# Patient Record
Sex: FEMALE | Race: WHITE | Employment: FULL TIME | URBAN - METROPOLITAN AREA
[De-identification: names, ages, dates, MRNs, and addresses within clinical notes are randomized per-mention and may not be internally consistent; named-entity substitution may affect disease eponyms.]

---

## 2020-07-24 ENCOUNTER — HOSPITAL ENCOUNTER (OUTPATIENT)
Dept: CT IMAGING | Age: 50
Discharge: HOME | End: 2020-07-26
Payer: COMMERCIAL

## 2020-07-24 PROCEDURE — 73700 CT LOWER EXTREMITY W/O DYE: CPT

## 2021-09-22 ENCOUNTER — HOSPITAL ENCOUNTER (INPATIENT)
Age: 51
LOS: 4 days | Discharge: HOME | DRG: 885 | End: 2021-09-27
Attending: EMERGENCY MEDICINE | Admitting: PSYCHIATRY & NEUROLOGY
Payer: COMMERCIAL

## 2021-09-22 DIAGNOSIS — R45.851 SUICIDAL IDEATION: Primary | ICD-10-CM

## 2021-09-22 DIAGNOSIS — R45.850 HOMICIDAL IDEATIONS: ICD-10-CM

## 2021-09-22 DIAGNOSIS — T50.902A INTENTIONAL DRUG OVERDOSE, INITIAL ENCOUNTER (HCC): ICD-10-CM

## 2021-09-22 LAB
ACETAMINOPHEN LEVEL: <5 MCG/ML (ref 10–30)
ALBUMIN SERPL-MCNC: 4.1 G/DL (ref 3.5–5.2)
ALP BLD-CCNC: 93 U/L (ref 35–104)
ALT SERPL-CCNC: 18 U/L (ref 0–32)
ANION GAP SERPL CALCULATED.3IONS-SCNC: 10 MMOL/L (ref 7–16)
AST SERPL-CCNC: 20 U/L (ref 0–31)
BACTERIA: ABNORMAL /HPF
BASOPHILS ABSOLUTE: 0.05 E9/L (ref 0–0.2)
BASOPHILS RELATIVE PERCENT: 0.9 % (ref 0–2)
BILIRUB SERPL-MCNC: 0.4 MG/DL (ref 0–1.2)
BILIRUBIN URINE: NEGATIVE
BLOOD, URINE: NEGATIVE
BUN BLDV-MCNC: 17 MG/DL (ref 6–20)
CALCIUM SERPL-MCNC: 8.9 MG/DL (ref 8.6–10.2)
CHLORIDE BLD-SCNC: 104 MMOL/L (ref 98–107)
CLARITY: CLEAR
CO2: 25 MMOL/L (ref 22–29)
COLOR: YELLOW
CREAT SERPL-MCNC: 0.7 MG/DL (ref 0.5–1)
EOSINOPHILS ABSOLUTE: 0.18 E9/L (ref 0.05–0.5)
EOSINOPHILS RELATIVE PERCENT: 3.4 % (ref 0–6)
EPITHELIAL CELLS, UA: ABNORMAL /HPF
ETHANOL: <10 MG/DL (ref 0–0.08)
GFR AFRICAN AMERICAN: >60
GFR NON-AFRICAN AMERICAN: >60 ML/MIN/1.73
GLUCOSE BLD-MCNC: 89 MG/DL (ref 74–99)
GLUCOSE URINE: NEGATIVE MG/DL
HCT VFR BLD CALC: 32.1 % (ref 34–48)
HEMOGLOBIN: 10.7 G/DL (ref 11.5–15.5)
IMMATURE GRANULOCYTES #: 0.02 E9/L
IMMATURE GRANULOCYTES %: 0.4 % (ref 0–5)
INFLUENZA A: NOT DETECTED
INFLUENZA B: NOT DETECTED
KETONES, URINE: NEGATIVE MG/DL
LEUKOCYTE ESTERASE, URINE: ABNORMAL
LYMPHOCYTES ABSOLUTE: 1.69 E9/L (ref 1.5–4)
LYMPHOCYTES RELATIVE PERCENT: 31.9 % (ref 20–42)
MCH RBC QN AUTO: 31.1 PG (ref 26–35)
MCHC RBC AUTO-ENTMCNC: 33.3 % (ref 32–34.5)
MCV RBC AUTO: 93.3 FL (ref 80–99.9)
MONOCYTES ABSOLUTE: 0.5 E9/L (ref 0.1–0.95)
MONOCYTES RELATIVE PERCENT: 9.4 % (ref 2–12)
NEUTROPHILS ABSOLUTE: 2.86 E9/L (ref 1.8–7.3)
NEUTROPHILS RELATIVE PERCENT: 54 % (ref 43–80)
NITRITE, URINE: NEGATIVE
PDW BLD-RTO: 13.8 FL (ref 11.5–15)
PH UA: 5.5 (ref 5–9)
PLATELET # BLD: 210 E9/L (ref 130–450)
PMV BLD AUTO: 10 FL (ref 7–12)
POTASSIUM REFLEX MAGNESIUM: 4 MMOL/L (ref 3.5–5)
PROTEIN UA: NEGATIVE MG/DL
RBC # BLD: 3.44 E12/L (ref 3.5–5.5)
RBC UA: ABNORMAL /HPF (ref 0–2)
SALICYLATE, SERUM: <0.3 MG/DL (ref 0–30)
SARS-COV-2 RNA, RT PCR: NOT DETECTED
SODIUM BLD-SCNC: 139 MMOL/L (ref 132–146)
SPECIFIC GRAVITY UA: >=1.03 (ref 1–1.03)
TOTAL PROTEIN: 6.2 G/DL (ref 6.4–8.3)
TRICYCLIC ANTIDEPRESSANTS SCREEN SERUM: NEGATIVE NG/ML
UROBILINOGEN, URINE: 0.2 E.U./DL
WBC # BLD: 5.3 E9/L (ref 4.5–11.5)
WBC UA: ABNORMAL /HPF (ref 0–5)

## 2021-09-22 PROCEDURE — 85025 COMPLETE CBC W/AUTO DIFF WBC: CPT

## 2021-09-22 PROCEDURE — 93005 ELECTROCARDIOGRAM TRACING: CPT | Performed by: EMERGENCY MEDICINE

## 2021-09-22 PROCEDURE — 82077 ASSAY SPEC XCP UR&BREATH IA: CPT

## 2021-09-22 PROCEDURE — 80307 DRUG TEST PRSMV CHEM ANLYZR: CPT

## 2021-09-22 PROCEDURE — 87636 SARSCOV2 & INF A&B AMP PRB: CPT

## 2021-09-22 PROCEDURE — 80143 DRUG ASSAY ACETAMINOPHEN: CPT

## 2021-09-22 PROCEDURE — 80179 DRUG ASSAY SALICYLATE: CPT

## 2021-09-22 PROCEDURE — 99285 EMERGENCY DEPT VISIT HI MDM: CPT

## 2021-09-22 PROCEDURE — 81001 URINALYSIS AUTO W/SCOPE: CPT

## 2021-09-22 PROCEDURE — 80053 COMPREHEN METABOLIC PANEL: CPT

## 2021-09-22 ASSESSMENT — ENCOUNTER SYMPTOMS
SINUS PRESSURE: 0
EYE PAIN: 0
NAUSEA: 0
EYE REDNESS: 0
ABDOMINAL DISTENTION: 0
SORE THROAT: 0
BACK PAIN: 0
WHEEZING: 0
DIARRHEA: 0
EYE DISCHARGE: 0
SHORTNESS OF BREATH: 0
VOMITING: 0
COUGH: 0

## 2021-09-22 NOTE — ED NOTES
Bed: 24  Expected date:   Expected time:   Means of arrival:   Comments:  Diann Gallagher RN  09/22/21 0464

## 2021-09-22 NOTE — ED PROVIDER NOTES
Chief Complaint   Patient presents with    Drug Overdose     While at work, pt took 10 Zanax pills and 3 gulps of Nyquil in attempt to harm herself. +HI. Patient is a 53-year female presents today via EMS from work for her drug overdose. She states she was at work, was irritated by her coworkers, and attempted to overdose on Xanax. States she did take at least 10 of her 0.5 mg Xanax which she is prescribed. States that she wants to hurt herself and hurt other people, when asked who she wants to hurt she states her coworkers because \"I hate my coworkers. \"  Has no other complaints at this time. Is somnolent but easily arousable. Has not tried her cell before in the past.  Denies chest pain, shortness of breath, fevers or chills. Did not take any other medications today other than 3 gulps of NyQuil. The history is provided by the patient. No  was used. Review of Systems   Constitutional: Negative for chills and fever. HENT: Negative for ear pain, sinus pressure and sore throat. Eyes: Negative for pain, discharge and redness. Respiratory: Negative for cough, shortness of breath and wheezing. Cardiovascular: Negative for chest pain. Gastrointestinal: Negative for abdominal distention, diarrhea, nausea and vomiting. Genitourinary: Negative for dysuria and frequency. Musculoskeletal: Negative for arthralgias and back pain. Skin: Negative for rash and wound. Neurological: Negative for weakness and headaches. Hematological: Negative for adenopathy. Psychiatric/Behavioral: Positive for decreased concentration and suicidal ideas. All other systems reviewed and are negative. Physical Exam  Vitals and nursing note reviewed. Constitutional:       General: She is not in acute distress. Appearance: She is well-developed. She is obese. She is not ill-appearing. HENT:      Head: Normocephalic and atraumatic.       Mouth/Throat:      Mouth: Mucous ideations  Intentional drug overdose, initial encounter [facility])  Suicidal ideation  Diagnosis management comments: Patient is a 69-year-old female presents today for intentional drug overdose and suicidal and homicidal ideations. On arrival she is somnolent, however easily arousable, vitals are stable. Labs were obtained. Patient was pink slipped. Patient is cleared medically. Social work consulted for placement. Amount and/or Complexity of Data Reviewed  Clinical lab tests: reviewed             ED Course as of Sep 22 2021   Wed Sep 22, 2021   [phone] Patient awake, able to walk to the bathroom with patient care staff accompanying her.    [SO]      ED Course User Index  [SO] Abby Ramsey, DO       --------------------------------------------- PAST HISTORY ---------------------------------------------  Past Medical History:  has a past medical history of Acid reflux, Adenocarcinoma in situ of cervix, Apnea, Basal cell carcinoma of forearm, left, Colitis, chronic, ulcerative ([facility] [address] Hypertension, Sarcoidosis, and Sleep apnea. Past Surgical History:  has a past surgical history that includes LEEP; Eye surgery; and Hysterectomy (Bilateral, 11/28/2017). Social History:  reports that she quit smoking about 9 years ago. She has a 15.00 pack-year smoking history. She has never used smokeless tobacco. She reports that she does not drink alcohol and does not use drugs. Family History: family history includes Cancer in her maternal grandfather; Ovarian Cancer in her maternal aunt. The patients home medications have been reviewed.     Allergies: Pcn [penicillins]    -------------------------------------------------- RESULTS -------------------------------------------------    LABS:  Results for orders placed or performed during the hospital encounter of 09/22/21   COVID-19 & Influenza Combo    Specimen: Nasopharyngeal Swab   Result Value Ref Range    SARS-CoV-2 RNA, RT PCR NOT DETECTED NOT DETECTED ---------------------------  Date / Time Roomed:  9/22/2021  3:06 PM  ED Bed Assignment:  [id number]/BH-28A    The nursing notes within the ED encounter and vital signs as below have been reviewed. Patient Vitals for the past 24 hrs:   BP Temp Pulse Resp SpO2 Weight   09/22/21 1509 (!) 142/67 98.2 °F (36.8 °C) 69 16 94 % 257 lb (116.6 kg)       Oxygen Saturation Interpretation: Normal    ------------------------------------------ PROGRESS NOTES ------------------------------------------    Counseling:  I have spoken with the patient and discussed todays results, in addition to providing specific details for the plan of care and counseling regarding the diagnosis and prognosis. Their questions are answered at this time and they are agreeable with the plan of admission.    --------------------------------- ADDITIONAL PROVIDER NOTES ---------------------------------    Medications - No data to display      Diagnosis:  1. Suicidal ideation    2. Homicidal ideations    3. Intentional drug overdose, initial encounter [facility])        Disposition:  Patient's disposition: Admit to mental health unit - medically cleared for admission  Patient's condition is stable.              Areli Barney DO  Resident  09/22/21 5825

## 2021-09-23 PROBLEM — R45.851 DEPRESSION WITH SUICIDAL IDEATION: Status: ACTIVE | Noted: 2021-09-23

## 2021-09-23 PROBLEM — F32.A DEPRESSION WITH SUICIDAL IDEATION: Status: ACTIVE | Noted: 2021-09-23

## 2021-09-23 PROBLEM — F31.81 MODERATE MIXED BIPOLAR II DISORDER (HCC): Status: ACTIVE | Noted: 2021-09-23

## 2021-09-23 LAB
AMPHETAMINE SCREEN, URINE: NOT DETECTED
BARBITURATE SCREEN URINE: NOT DETECTED
BENZODIAZEPINE SCREEN, URINE: POSITIVE
CANNABINOID SCREEN URINE: NOT DETECTED
COCAINE METABOLITE SCREEN URINE: NOT DETECTED
EKG ATRIAL RATE: 53 BPM
EKG P AXIS: 18 DEGREES
EKG P-R INTERVAL: 136 MS
EKG Q-T INTERVAL: 460 MS
EKG QRS DURATION: 92 MS
EKG QTC CALCULATION (BAZETT): 431 MS
EKG R AXIS: 36 DEGREES
EKG T AXIS: 48 DEGREES
EKG VENTRICULAR RATE: 53 BPM
FENTANYL SCREEN, URINE: NOT DETECTED
Lab: ABNORMAL
METHADONE SCREEN, URINE: NOT DETECTED
OPIATE SCREEN URINE: NOT DETECTED
OXYCODONE URINE: NOT DETECTED
PHENCYCLIDINE SCREEN URINE: NOT DETECTED

## 2021-09-23 PROCEDURE — 6370000000 HC RX 637 (ALT 250 FOR IP): Performed by: PSYCHIATRY & NEUROLOGY

## 2021-09-23 PROCEDURE — 99221 1ST HOSP IP/OBS SF/LOW 40: CPT | Performed by: NURSE PRACTITIONER

## 2021-09-23 PROCEDURE — 6370000000 HC RX 637 (ALT 250 FOR IP): Performed by: NURSE PRACTITIONER

## 2021-09-23 PROCEDURE — 1240000000 HC EMOTIONAL WELLNESS R&B

## 2021-09-23 RX ORDER — TRAZODONE HYDROCHLORIDE 50 MG/1
50 TABLET ORAL NIGHTLY PRN
Status: DISCONTINUED | OUTPATIENT
Start: 2021-09-23 | End: 2021-09-27 | Stop reason: HOSPADM

## 2021-09-23 RX ORDER — HYDROXYZINE PAMOATE 50 MG/1
50 CAPSULE ORAL 3 TIMES DAILY PRN
Status: DISCONTINUED | OUTPATIENT
Start: 2021-09-23 | End: 2021-09-27 | Stop reason: HOSPADM

## 2021-09-23 RX ORDER — HYDROCHLOROTHIAZIDE 25 MG/1
25 TABLET ORAL DAILY
Status: DISCONTINUED | OUTPATIENT
Start: 2021-09-23 | End: 2021-09-27 | Stop reason: HOSPADM

## 2021-09-23 RX ORDER — NICOTINE 21 MG/24HR
1 PATCH, TRANSDERMAL 24 HOURS TRANSDERMAL DAILY
Status: DISCONTINUED | OUTPATIENT
Start: 2021-09-23 | End: 2021-09-26

## 2021-09-23 RX ORDER — ARIPIPRAZOLE 5 MG/1
5 TABLET ORAL DAILY
Status: DISCONTINUED | OUTPATIENT
Start: 2021-09-23 | End: 2021-09-27 | Stop reason: HOSPADM

## 2021-09-23 RX ORDER — HALOPERIDOL 5 MG/ML
5 INJECTION INTRAMUSCULAR EVERY 6 HOURS PRN
Status: DISCONTINUED | OUTPATIENT
Start: 2021-09-23 | End: 2021-09-27 | Stop reason: HOSPADM

## 2021-09-23 RX ORDER — ACETAMINOPHEN 325 MG/1
650 TABLET ORAL EVERY 6 HOURS PRN
Status: DISCONTINUED | OUTPATIENT
Start: 2021-09-23 | End: 2021-09-27 | Stop reason: HOSPADM

## 2021-09-23 RX ORDER — OXCARBAZEPINE 300 MG/1
300 TABLET, FILM COATED ORAL 2 TIMES DAILY
Status: DISCONTINUED | OUTPATIENT
Start: 2021-09-23 | End: 2021-09-27 | Stop reason: HOSPADM

## 2021-09-23 RX ORDER — MAGNESIUM HYDROXIDE/ALUMINUM HYDROXICE/SIMETHICONE 120; 1200; 1200 MG/30ML; MG/30ML; MG/30ML
30 SUSPENSION ORAL PRN
Status: DISCONTINUED | OUTPATIENT
Start: 2021-09-23 | End: 2021-09-27 | Stop reason: HOSPADM

## 2021-09-23 RX ORDER — LOSARTAN POTASSIUM AND HYDROCHLOROTHIAZIDE 25; 100 MG/1; MG/1
1 TABLET ORAL DAILY
Status: DISCONTINUED | OUTPATIENT
Start: 2021-09-23 | End: 2021-09-23 | Stop reason: RX

## 2021-09-23 RX ORDER — HYDROCHLOROTHIAZIDE 12.5 MG/1
12.5 TABLET ORAL DAILY
Status: DISCONTINUED | OUTPATIENT
Start: 2021-09-23 | End: 2021-09-23

## 2021-09-23 RX ORDER — LOSARTAN POTASSIUM 50 MG/1
100 TABLET ORAL DAILY
Status: DISCONTINUED | OUTPATIENT
Start: 2021-09-23 | End: 2021-09-27 | Stop reason: HOSPADM

## 2021-09-23 RX ORDER — HALOPERIDOL 5 MG
5 TABLET ORAL EVERY 6 HOURS PRN
Status: DISCONTINUED | OUTPATIENT
Start: 2021-09-23 | End: 2021-09-27 | Stop reason: HOSPADM

## 2021-09-23 RX ORDER — SULFASALAZINE 500 MG/1
500 TABLET ORAL 2 TIMES DAILY WITH MEALS
Status: DISCONTINUED | OUTPATIENT
Start: 2021-09-23 | End: 2021-09-27 | Stop reason: HOSPADM

## 2021-09-23 RX ORDER — PANTOPRAZOLE SODIUM 20 MG/1
20 TABLET, DELAYED RELEASE ORAL DAILY
Status: DISCONTINUED | OUTPATIENT
Start: 2021-09-23 | End: 2021-09-26

## 2021-09-23 RX ADMIN — SULFASALAZINE 500 MG: 500 TABLET ORAL at 13:38

## 2021-09-23 RX ADMIN — HYDROCHLOROTHIAZIDE 25 MG: 25 TABLET ORAL at 13:38

## 2021-09-23 RX ADMIN — SULFASALAZINE 500 MG: 500 TABLET ORAL at 20:46

## 2021-09-23 RX ADMIN — OXCARBAZEPINE 300 MG: 300 TABLET, FILM COATED ORAL at 13:46

## 2021-09-23 RX ADMIN — TRAZODONE HYDROCHLORIDE 50 MG: 50 TABLET ORAL at 23:03

## 2021-09-23 RX ADMIN — LOSARTAN POTASSIUM 100 MG: 50 TABLET, FILM COATED ORAL at 13:38

## 2021-09-23 RX ADMIN — OXCARBAZEPINE 300 MG: 300 TABLET, FILM COATED ORAL at 20:46

## 2021-09-23 RX ADMIN — ARIPIPRAZOLE 5 MG: 5 TABLET ORAL at 13:41

## 2021-09-23 ASSESSMENT — SLEEP AND FATIGUE QUESTIONNAIRES
DIFFICULTY STAYING ASLEEP: YES
DO YOU HAVE DIFFICULTY SLEEPING: YES
DIFFICULTY ARISING: NO
DO YOU USE A SLEEP AID: NO
AVERAGE NUMBER OF SLEEP HOURS: 4
RESTFUL SLEEP: NO
DIFFICULTY STAYING ASLEEP: YES
DIFFICULTY ARISING: YES
DO YOU HAVE DIFFICULTY SLEEPING: YES
DIFFICULTY FALLING ASLEEP: YES
DIFFICULTY FALLING ASLEEP: YES
RESTFUL SLEEP: NO
DO YOU USE A SLEEP AID: NO
AVERAGE NUMBER OF SLEEP HOURS: 4
SLEEP PATTERN: INSOMNIA
SLEEP PATTERN: DIFFICULTY FALLING ASLEEP

## 2021-09-23 ASSESSMENT — PAIN SCALES - GENERAL: PAINLEVEL_OUTOF10: 0

## 2021-09-23 ASSESSMENT — LIFESTYLE VARIABLES
HISTORY_ALCOHOL_USE: NO
HISTORY_ALCOHOL_USE: NO

## 2021-09-23 NOTE — PROGRESS NOTES
[facility]  Admission Note       45year old female admitted to the unit on an involuntary from [facility] for suicide attempt by OD  Pt admitted to taking pills because she was upset about her job  Denies past SA  Denies any psych history  Denies any drug or etoh history    Admission Type:   Admission Type:  Involuntary    Reason for admission:  Reason for Admission: \"I'm here because I swollowed a bunch of pills because I was mad\"    PATIENT STRENGTHS:  Strengths: Communication, Positive Support    Patient Strengths and Limitations:  Limitations: Difficulty problem solving/relies on others to help solve problems, Lacks leisure interests    Addictive Behavior:   Addictive Behavior  In the past 3 months, have you felt or has someone told you that you have a problem with:  : None  Do you have a history of Chemical Use?: No  Do you have a history of Alcohol Use?: No  Do you have a history of Street Drug Abuse?: No  Histroy of Prescripton Drug Abuse?: No    Medical Problems:   Past Medical History:   Diagnosis Date    Acid reflux     Adenocarcinoma in situ of cervix 11/28/2017    s/p Hyst     Apnea     Basal cell carcinoma of forearm, left     Colitis, chronic, ulcerative ([facility] [address]     Hypertension     Sarcoidosis     Sleep apnea        Status EXAM:  Status and Exam  Normal: No  Facial Expression: Exaggerated, Elevated  Affect: Congruent  Level of Consciousness: Alert  Mood:Normal: No  Mood: Anxious, Labile  Motor Activity:Normal: Yes  Interview Behavior: Cooperative  Preception: [place] to Person, Thurnell Merle to Time, [place] to Place, [place] to Situation  Attention:Normal: No  Attention: Distractible  Thought Processes: Circumstantial  Thought Content:Normal: No  Thought Content: Preoccupations  Hallucinations: None  Delusions: No  Memory:Normal: No  Memory: Poor Recent  Insight and Judgment: No  Insight and Judgment: Poor Judgment, Poor Insight  Present Suicidal Ideation: No  Present Homicidal Ideation: No    Tobacco Screening:  Practical Counseling, on admission, chaim X, if applicable and completed (first 3 are required if patient doesn't refuse):            ( )  Recognizing danger situations (included triggers and roadblocks)                    ( )  Coping skills (new ways to manage stress, exercise, relaxation techniques, changing routine, distraction)                                                           ( )  Basic information about quitting (benefits of quitting, techniques in how to quit, available resources  ( ) Referral for counseling faxed to [facility]                                           ( ) Patient refused counseling  ( x) Patient has not smoked in the last 30 days    Metabolic Screening:    No results found for: LABA1C    No results found for: CHOL  No results found for: TRIG  No results found for: HDL  No components found for: LDLCAL  No results found for: LABVLDL      Body mass index is 41.48 kg/m². BP Readings from Last 2 Encounters:   09/23/21 (!) 130/90   02/03/21 (!) 148/80           Pt admitted with followings belongings:  Other Valuables: Cell phone (given to   9/23)     . Patient oriented to surroundings and program expectations and copy of patient rights given. Received admission packet:  . Consents reviewed, signed . Adelina Merritt Patient verbalize understanding:  .   Patient education on precautions:                    Frandy Peterson RN

## 2021-09-23 NOTE — ED NOTES
Initial contact with pt, alert and oriented x 4. Skin warm and dry. Lung sounds clear bilaterally. Abdomen soft distended, bowel sounds present x 4. ble mild edema noted, pedal pulses present bilateral. When talking with pt asked if SI,HI- pt voices \"should tell you people no- this nurse told pt to be honest- pt voiced yes weepy like voice\" stating at work and was agitated and annoyed with coworkers\" Pt resting  ekg obtained for admission, vital signs taken. Pt voiced wanting to eat, box lunch and water given to pt. This nurse also spoke with pt mother wanting to know if pt can get home meds . This nurse told pt mom that medication needs to be ordered by the admitting dr and that this nurse would go over home med list. No acute distress noted. Every 15 min checks maintained as suicidal precaution continues to.      Naz Coppola LPN  24/53/23 0367

## 2021-09-23 NOTE — PROGRESS NOTES
Admission note:  Pt escorted via W/C accompanied by Transport from the [facility] for involuntary pink slip inpt admit to [address] room 9324Z. Pt refused snack and beverage. Tearful and fretful. Alert and oriented x 4. Given menus, forms and questionnaire to complete. Pt got phone numbers out of her cell phone. Placed on close observation staggered q 15 min checks. Will report to coming shift. Belongings at NS.

## 2021-09-23 NOTE — CARE COORDINATION
Biopsychosocial Assessment Note    Social work met with patient to complete the biopsychosocial assessment and CSSR-S. Mental Status Exam: pt alert&oriented x4. Pt cooperative, friendly. Mood anxious, labile, laughing inappropriately. Eye contact good, speech rapid and pressured. Pt thoughts loose association, tangential. Insight/judgement poor. Pt denies SI/HI/AVH. Chief Complaint: Arcadio Thomas is a 46 yo female presenting to the ED for intentional drug overdose, While at work, pt took 10 Zanax pills and 3 gulps of Nyquil in attempt to harm herself. +HI\"    Patient Report: pt reports she took more pills than needed to calm down, pt reports she was not trying to kill and or hurt herself, she reports she just wanted to calm down. Pt denies psych admissions hx, denies hx of attempts or self injurious behaviors. Pt reports SI when she gets angry. Pt denies AOD hx, denies trauma hx. Pt reports current stressors include having to care for her mother and with work. Pt feels her employer is trying to get rid of her, reports they write her up for everything. Pt reported legal hx of being charged with a felony after plea guilty to an accusation of stealing money from her past employer [address] [facility]       Gender  [] Male [x] Female [] Transgender  [] Other    Sexual Orientation    [x] Heterosexual [] Homosexual [] Bisexual [] Other    Suicidal Ideation  [x] Past [] Present [] Denies     Homicidal Ideation  [] Past [] Present [x] Denies     Hallucinations/Delusions (Specify type)  [] Reports [x] Denies     Substance Use/Alcohol Use/Addiction  [] Reports [x] Denies     Tobacco Use (within the last 6 months)  [x] Reports [] Denies     Trauma History  [] Reports [x] Denies     Collateral Contact (GIDEON signed)  Name: Mary Jo Asencio  Relationship:   Number:     Collateral Information:        Access to Weapons per Collateral Contact: [] Reports [] Denies       Follow up provider preference: needs referred       Plan for discharge  Location (where do they plan on discharging to?): home to     Transportation (who will pick them up at discharge?)     Medications (will they have money for copays at discharge?): can pay for copays

## 2021-09-23 NOTE — ED NOTES
Pt remains sleeping, no acute distress noted. Suicide precautions maintained and 15 minute checks continue by staff.       Shanae Carver LPN  33/67/75 9515

## 2021-09-23 NOTE — ED NOTES
Emergency Department [facility] Biopsychosocial Assessment Note    Pt is pink slipped    Chief Complaint:     Pt is a 46 yo female presenting to the ED for intentional drug overdose, While at work, pt took 10 Zanax pills and 3 gulps of Nyquil in attempt to harm herself. +HI. MSE:    Pt is calm, oriented x 4, alert, labile affect, laughing inappropriately, down playing situation, paranoid?,good eye contact, pressured speech, admits to suicide attempt, denies HI but wishes her co-workers would die, denies AVH, reports poor sleep and appetite. Clinical Summary/History:     Pt reports a  hx of depression and anxiety. Pt is currently taking Paxil? Which is prescribed by her PCP, Pt is not connected to outpatient [place] [id number] services. Pt reports increased depression and anxiety starting unknown time ago and escalated today while Pt was at work. Pt reports that people at work are very demeaning to her, are constantly finding reasons to get her in trouble, management writes her up for reasons others do not get written up for. Pt believes they are trying to force her to quite and since that has not happen she believes she is about to get fired. Pt also reports increased stress due to living with her Mom who is unable to care for herself. Pt made multiple statements of having a tough life that has never been easy. Pt was laughing throughout her assessment, when Pt found out she was being admitted and would not be allowed to leave at this time she became upset and started crying very hard saying she wished she succeeded in dying this morning. Pt on phone with  making this statements as well. Pt denies AOD, Pt did state that the xanax she over dosed on was her prescription for the 80's    Gender  [] Male [x] Female [] Transgender  [] Other    Sexual Orientation    [x] Heterosexual [] Homosexual [] Bisexual [] Other    Suicidal Behavioral: CSSR-S Complete.   [x] Reports:    [] Past [x] Present   [] Denies    Homicidal/ Violent Behavior  [] Reports:   [] Past [] Present   [x] Denies  Possibly passive    Hallucinations/Delusions   [] Reports:   [x] Denies     Substance Use/Alcohol Use/Addiction: SBIRT Screen Complete. [] Reports:   [x] Denies     Trauma History  [x] Reports:  According to Pt long term exposure to hostile working                      Environment. Also made statements of a very tough life. [] Denies     Collateral Information:     No collateral information obtained at this time. Level of Care/Disposition Plan  [] Home:   [] Outpatient Provider:   [] Crisis Unit:   [x] Inpatient Psychiatric Unit:  [] Other:     [facility] SW will pursue inpatient admission to ensure safety and stabilization.      Ember Woodbridge, JENNIFER, [facility]  09/22/21 1378       Ember Woodbridge, MSLANCE, [facility]  09/22/21 0382

## 2021-09-23 NOTE — ED NOTES
Pt came in from ER looking for her belongings, found 2 bags in locker 29, moved belongings to locker #28, pt is relieved, in bed talking using pt cell phone.

## 2021-09-23 NOTE — H&P
Department of Psychiatry  History and Physical - Adult     CHIEF COMPLAINT:  \" I and facing other hearing on .\"    Patient was seen after discussing with the treatment team and reviewing the chart    CIRCUMSTANCES OF ADMISSION: present to the ED for an intentional drug overdose after taking 10 Xanax pills and 3 gulps of NyQuil while at work in an attempt to kill her self    HISTORY OF PRESENT ILLNESS:      The patient is a 45 y.o. 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 In the ED she had a labile affect was laughing inappropriately minimizing the circumstance of her hospitalization appear to possibly be paranoid. She reported to the ER physician that she wanted to hurt her coworkers because \"I hate my coworkers. \"  She denied any active homicidal ideation but apparently reported that she \"wished her coworkers would die. \"  She reported that people at work are very demeaning to her constantly finding reasons to get her in trouble and that management writes her for reasons that others do not get written out for. In the ED she continues to make suicidal statements reported that she had wished that she had  when she took the overdose she reported increased stress because she lives with her mom who is unable to care for herself. In the ED her urine drug screen is positive benzodiazepines her blood alcohol level is negative she was medically cleared admitted to [address] [place] [address] psychiatric unit for further psychiatric assessment stabilization and treatment. Upon evaluation the patient is hyperverbal with tangential thought process.   She has difficulty answering questions directly and goes off in tangents and has to be asked the same question multiple times in order to get an answer. She states that she has had multiple disciplinary actions against her and that she feels like her coworkers are out to get her as well as her bosses. She states that she had legal trouble the past another job when they accused her of stealing money which she states she would never do but that she had no way of proving that she is still the money so she took a plea deal.  She said she has a history of having trouble keeping jobs. Her speech is very rapid pressured she is extremely tangential disorganized she is minimizing circumstance or hospitalization unable to say how she got into the hospital other than to say she thinks her coworkers did call an ambulance she said she does not remember anything except waking up in the ER. She is discharged focused reported she has to be home to take care of her mother who is ill. She has no insight and judgment to hospitalization need for treatment she is minimizing the circumstance of her hospitalization she stated multiple suicidal statements and had a significant overdose attempt. Past psychiatric history: Patient denies any history of any inpatient psychiatric treatment she is never seen a psychiatrist as been prescribed Xanax over 10 years ago and states that the Xanax that she took the during the overdose as well as Paxil both prescribed by her primary care doctor. She states she did see a counselor in the past was having difficulty explaining what that was for.   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      Past Medical History:        Diagnosis Date    Acid reflux     Adenocarcinoma in situ of cervix 11/28/2017    s/p Hyst     Apnea     Basal cell carcinoma of forearm, left     Colitis, chronic, ulcerative ([facility] [address]     Hypertension     Sarcoidosis     Sleep apnea        Medications Prior to Admission:   Medications Prior to Admission: CPAP Machine MISC, by Does not apply route  Multiple Vitamins-Minerals (MULTIVITAMIN PO), Take by mouth 2 times daily  omeprazole (PRILOSEC) 20 MG delayed release capsule, Take 20 mg by mouth daily  Chlorphen-Pseudoephed-APAP (ALLERGY/SINUS APAP PO), Take by mouth as needed  ATENOLOL PO, Take 75 mg by mouth daily   hydrochlorothiazide (HYDRODIURIL) 12.5 MG tablet, Take by mouth daily   losartan-hydroCHLOROthiazide (HYZAAR) 100-25 MG per tablet, Take 1 tablet by mouth daily   PARoxetine (PAXIL) 10 MG tablet, Take 40 mg by mouth every morning   sulfaSALAzine (AZULFIDINE) 500 MG tablet, Take 500 mg by mouth 2 times daily     Past Surgical History:        Procedure Laterality Date    EYE SURGERY      x 2    HYSTERECTOMY Bilateral 11/28/2017    [facility]  [facility]       Allergies:   Pcn [penicillins]    Family History  Family History   Problem Relation Age of Onset    Cancer Maternal Grandfather         Bladder    Ovarian Cancer Maternal Aunt     Colon Cancer Neg Hx     Uterine Cancer Neg Hx     Breast Cancer Neg Hx              EXAMINATION:    REVIEW OF SYSTEMS:    ROS:  [x] All negative/unchanged except if checked.  Explain positive(checked items) below:  [] Constitutional  [] Eyes  [] Ear/Nose/Mouth/Throat  [] Respiratory  [] CV  [] GI  []   [] Musculoskeletal  [] Skin/Breast  [] Neurological  [] Endocrine  [] Heme/Lymph  [] Allergic/Immunologic    Explanation:     Vitals:  BP (!) 137/101   Pulse 87   Temp 97.4 °F (36.3 °C) (Temporal)   Resp 16   Wt 257 lb (116.6 kg)   SpO2 98%   BMI 41.48 kg/m²      Physical Examination:   Head: x  Atraumatic: x normocephalic  Skin and Mucosa        Moist x  Dry   Pale  x Normal   Neck:  Thyroid  Palpable   x  Not palpable   venus distention   adenopathy   Chest: x Clear   Rhonchi     Wheezing   CV:  xS1   xS2    xNo murmer   Abdomen:  x  Soft    Tender    Viceromegaly   Extremities:  x No Edema     Edema     Cranial Nerves Examination:   CN II:   xPupils are reactive to light  Pupils are non reactive to light  CN III, IV, VI:  xNo eye deviation    No diplopia or ptosis   CN V:    xFacial Sensation is intact     Facial Sensation is not intact   CN IIIV:   x Hearing is normal to rubbing fingers   CN IX, X:     xNormal gag reflex and phonation   CN XI:   xShoulder shrug and neck rotation is normal  CNXII:    xTongue is midline no deviation or atrophy    Mental Status Examination:    Level of consciousness:  within normal limits   Appearance:  well-appearing  Behavior/Motor:  no abnormalities noted  Attitude toward examiner:  cooperative  Speech:  spontaneous, normal rate and normal volume   Mood: \" I feel fine and ready to go home. \"  Affect: Mood and congruent elated  Thought processes: Tangential disorganized  Thought content: Devoid any auditory visualizations delusions during the perceptual abnormalities denies suicidal ideation despite serious suicide attempt denies homicidal ideations  Cognition:  oriented to person, place, and time   Concentration intact  Memory intact  Insight poor   Judgement poor   Fund of Knowledge limited      DIAGNOSIS:  Bipolar 2 mixed moderate          LABS: REVIEWED TODAY:  Recent Labs     09/22/21  1524   WBC 5.3   HGB 10.7*        Recent Labs     09/22/21  1524      K 4.0      CO2 25   BUN 17   CREATININE 0.7   GLUCOSE 89     Recent Labs 09/22/21  1524   BILITOT 0.4   ALKPHOS 93   AST 20   ALT 18     Lab Results   Component Value Date    LABAMPH NOT DETECTED 09/22/2021    BARBSCNU NOT DETECTED 09/22/2021    LABBENZ POSITIVE 09/22/2021    LABMETH NOT DETECTED 09/22/2021    OPIATESCREENURINE NOT DETECTED 09/22/2021    PHENCYCLIDINESCREENURINE NOT DETECTED 09/22/2021    ETOH <10 09/22/2021     No results found for: TSH, FREET4  No results found for: LITHIUM  No results found for: VALPROATE, CBMZ  No results found for: LITHIUM, VALPROATE      Radiology No results found. TREATMENT PLAN:    Risk Management: Based on the diagnosis and assessment biopsychosocial treatment model was presented to the patient and was given the opportunity to ask any question. The patient was agreeable to the plan and all the patient's questions were answered to the patient's satisfaction. I discussed with the patient the risk, benefit, alternative and common side effects for the proposed medication treatment. The patient is consenting to this treatment. Collateral Information:  Will obtain collateral information from the family or friends. Will obtain medical records as appropriate from out patient providers  Will consult the hospitalist for a physical exam to rule out any co-morbid physical condition. Patient's diagnosis, treatment plan, medication management was formulated at the end of evaluation and after reviewing relevant documentation. Patient was seen directly by myself and Dr. William Hale    Trileptal 300 mg twice daily for mood stabilization  Abilify 5 mg daily      Prn Haldol 5mg and Vistaril 50mg q6hr for extreme agitation.   Trazodone as ordered for insomnia  Vistaril as ordered for anxiety      Psychotherapy:   Encourage participation in milieu and group therapy  Individual therapy as needed        [place] Medicare Certification Upon Admission    I certify that this patient's inpatient psychiatric hospital admission is medically necessary for:    [x] (1) Treatment which could reasonably be expected to improve this patient's condition,       [] (2) Or for diagnostic study;     AND     [x](2) The inpatient psychiatric services are provided while the individual is under the care of a physician and are included in the individualized plan of care.     Estimated length of stay/service 3 to 7 days based on stability    Plan for post-hospital care outpatient psychiatric and counseling services    Electronically signed by CALE Arce CNP on 6/47/1385 at 8:15 AM        Electronically signed by CALE Arce CNP on 0/55/4575 at 8:15 AM

## 2021-09-24 PROCEDURE — 6370000000 HC RX 637 (ALT 250 FOR IP): Performed by: NURSE PRACTITIONER

## 2021-09-24 PROCEDURE — 1240000000 HC EMOTIONAL WELLNESS R&B

## 2021-09-24 PROCEDURE — 99232 SBSQ HOSP IP/OBS MODERATE 35: CPT | Performed by: NURSE PRACTITIONER

## 2021-09-24 PROCEDURE — 6370000000 HC RX 637 (ALT 250 FOR IP): Performed by: PSYCHIATRY & NEUROLOGY

## 2021-09-24 RX ORDER — ATENOLOL 50 MG/1
50 TABLET ORAL DAILY
Status: DISCONTINUED | OUTPATIENT
Start: 2021-09-24 | End: 2021-09-27 | Stop reason: HOSPADM

## 2021-09-24 RX ADMIN — OXCARBAZEPINE 300 MG: 300 TABLET, FILM COATED ORAL at 21:13

## 2021-09-24 RX ADMIN — SULFASALAZINE 500 MG: 500 TABLET ORAL at 21:13

## 2021-09-24 RX ADMIN — OXCARBAZEPINE 300 MG: 300 TABLET, FILM COATED ORAL at 08:48

## 2021-09-24 RX ADMIN — ACETAMINOPHEN 650 MG: 325 TABLET ORAL at 07:54

## 2021-09-24 RX ADMIN — LOSARTAN POTASSIUM 100 MG: 50 TABLET, FILM COATED ORAL at 08:47

## 2021-09-24 RX ADMIN — ATENOLOL 50 MG: 50 TABLET ORAL at 11:26

## 2021-09-24 RX ADMIN — TRAZODONE HYDROCHLORIDE 50 MG: 50 TABLET ORAL at 21:13

## 2021-09-24 RX ADMIN — ARIPIPRAZOLE 5 MG: 5 TABLET ORAL at 08:47

## 2021-09-24 RX ADMIN — PANTOPRAZOLE SODIUM 20 MG: 20 TABLET, DELAYED RELEASE ORAL at 06:54

## 2021-09-24 RX ADMIN — HYDROCHLOROTHIAZIDE 25 MG: 25 TABLET ORAL at 08:48

## 2021-09-24 RX ADMIN — SULFASALAZINE 500 MG: 500 TABLET ORAL at 08:48

## 2021-09-24 ASSESSMENT — PAIN SCALES - GENERAL
PAINLEVEL_OUTOF10: 0
PAINLEVEL_OUTOF10: 4

## 2021-09-24 NOTE — PROGRESS NOTES
BEHAVIORAL HEALTH FOLLOW-UP NOTE     2021     Patient was seen and examined in person, Chart reviewed   Patient's case discussed with staff/team    Chief Complaint: \" I am just tired from medication. \"    Interim History: Patient seen in her room she is resting. She denies SI/HI intent or plan denies any auditory visualizations states the medications are making her tired.   Staff reports she was very labile yesterday she minimizes circumstance or hospitalization she has very poor limited insight and judgment to hospitalization need for treatment      Appetite:   [x] Normal/Unchanged  [] Increased  [] Decreased      Sleep:       [x] Normal/Unchanged  [] Fair       [] Poor              Energy:    [x] Normal/Unchanged  [] Increased  [] Decreased        SI [] Present  [x] Absent    HI  []Present  [x] Absent     Aggression:  [] yes  [x] no    Patient is [x] able  [] unable to CONTRACT FOR SAFETY     PAST MEDICAL/PSYCHIATRIC HISTORY:   Past Medical History:   Diagnosis Date    Acid reflux     Adenocarcinoma in situ of cervix 2017    s/p Hyst     Apnea     Basal cell carcinoma of forearm, left     [facility]itis, chronic, ulcerative ([facility] [address]     Hypertension     Sarcoidosis     Sleep apnea        FAMILY/SOCIAL HISTORY:  Family History   Problem Relation Age of Onset    Cancer Maternal Grandfather         Bladder    Ovarian Cancer Maternal Aunt     Colon Cancer Neg Hx     Uterine Cancer Neg Hx     Breast Cancer Neg Hx      Social History     Socioeconomic History    Marital status: Single     Spouse name: Not on file    Number of children: Not on file    Years of education: Not on file    Highest education level: Not on file   Occupational History    Not on file   Tobacco Use    Smoking status: Former Smoker     Packs/day: 0.50     Years: 30.00     Pack years: 15.00     Quit date:      Years since quittin.7    Smokeless tobacco: Never Used   Substance and Sexual Activity    Alcohol use: No    Drug use: No    Sexual activity: Yes   Other Topics Concern    Not on file   Social History Narrative    Not on file     Social Determinants of Health     Financial Resource Strain:     Difficulty of Paying Living Expenses:    Food Insecurity:     Worried About Running Out of Food in the Last Year:     [address] in the Last Year:    Transportation Needs:     Lack of Transportation (Medical):  Lack of Transportation (Non-Medical):    Physical Activity:     Days of Exercise per Week:     Minutes of Exercise per Session:    Stress:     Feeling of Stress :    Social Connections:     Frequency of Communication with Friends and Family:     Frequency of Social Gatherings with Friends and Family:     Attends Christian Services:     Active Member of Clubs or Organizations:     Attends Club or Organization Meetings:     Marital Status:    Intimate Partner Violence:     Fear of Current or Ex-Partner:     Emotionally Abused:     Physically Abused:     Sexually Abused:            ROS:  [x] All negative/unchanged except if checked.  Explain positive(checked items) below:  [] Constitutional  [] Eyes  [] Ear/Nose/Mouth/Throat  [] Respiratory  [] CV  [] GI  []   [] Musculoskeletal  [] Skin/Breast  [] Neurological  [] Endocrine  [] Heme/Lymph  [] Allergic/Immunologic    Explanation:     MEDICATIONS:    Current Facility-Administered Medications:     atenolol (TENORMIN) tablet 50 mg, 50 mg, Oral, Daily, Janene Perry, APRN - CNP, 50 mg at 09/24/21 1126    acetaminophen (TYLENOL) tablet 650 mg, 650 mg, Oral, Q6H PRN, Elizabeth Christine MD, 650 mg at 09/24/21 0754    magnesium hydroxide (MILK OF MAGNESIA) 400 MG/5ML suspension 30 mL, 30 mL, Oral, Daily PRN, Elizabeth Christine MD    nicotine (NICODERM CQ) 21 MG/24HR 1 patch, 1 patch, TransDERmal, Daily, Elizabeth Christine MD    aluminum & magnesium hydroxide-simethicone (MAALOX) 200-200-20 MG/5ML suspension 30 mL, 30 mL, Oral, PRN, Elizabeth Christine MD  [facility] hydrOXYzine (VISTARIL) capsule 50 mg, 50 mg, Oral, TID PRN, Peggy Merritt MD    haloperidol (HALDOL) tablet 5 mg, 5 mg, Oral, Q6H PRN **OR** haloperidol lactate (HALDOL) injection 5 mg, 5 mg, IntraMUSCular, Q6H PRN, Peggy Merritt MD    traZODone (DESYREL) tablet 50 mg, 50 mg, Oral, Nightly PRN, Peggy Merritt MD, 50 mg at 09/23/21 2303    pantoprazole (PROTONIX) tablet 20 mg, 20 mg, Oral, Daily, Patricia Jacobson APRN - CNP, 20 mg at 09/24/21 0654    sulfaSALAzine (AZULFIDINE) tablet 500 mg, 500 mg, Oral, BID WC, Elsa Marts Dellick, APRN - CNP, 731 mg at 09/24/21 0848    losartan (COZAAR) tablet 100 mg, 100 mg, Oral, Daily, 100 mg at 09/24/21 0847 **AND** hydroCHLOROthiazide (HYDRODIURIL) tablet 25 mg, 25 mg, Oral, Daily, Elsa Marts Dellick, APRN - CNP, 25 mg at 09/24/21 0848    OXcarbazepine (TRILEPTAL) tablet 300 mg, 300 mg, Oral, BID, Elsa Marts Dellick, APRN - CNP, 294 mg at 09/24/21 0848    ARIPiprazole (ABILIFY) tablet 5 mg, 5 mg, Oral, Daily, Elsa Marts Dellick, APRN - CNP, 5 mg at 09/24/21 0847      Examination:  BP (!) 142/82   Pulse 92   Temp 97.5 °F (36.4 °C) (Temporal)   Resp 16   Wt 257 lb (116.6 kg)   SpO2 98%   BMI 41.48 kg/m²   Gait - steady  Medication side effects(SE): denies    Mental Status Examination:    Level of consciousness:  within normal limits   Appearance:  fair grooming and fair hygiene  Behavior/Motor:  no abnormalities noted  Attitude toward examiner:  cooperative  Speech:  spontaneous, normal rate and normal volume   Mood: \" I am just tired. \"  Affect: Appropriate and pleasant  Thought processes: Linear without flight of ideas loose associations  Thought content: Devoid of any auditory visual loose [place] delusions or other perceptual abnormalities.   Denies SI/HI intent or plan  Cognition:  oriented to person, place, and time   Concentration intact  Insight poor   Judgement poor     ASSESSMENT:   Patient symptoms are:  [] Well controlled  [x] Improving  [] Worsening  [] No change      Diagnosis:   Principal Problem: Moderate mixed bipolar II disorder (HCC)  Resolved Problems:    * No resolved hospital problems. *      LABS:    Recent Labs     09/22/21  1524   WBC 5.3   HGB 10.7*        Recent Labs     09/22/21  1524      K 4.0      CO2 25   BUN 17   CREATININE 0.7   GLUCOSE 89     Recent Labs     09/22/21  1524   BILITOT 0.4   ALKPHOS 93   AST 20   ALT 18     Lab Results   Component Value Date    LABAMPH NOT DETECTED 09/22/2021    BARBSCNU NOT DETECTED 09/22/2021    LABBENZ POSITIVE 09/22/2021    LABMETH NOT DETECTED 09/22/2021    OPIATESCREENURINE NOT DETECTED 09/22/2021    PHENCYCLIDINESCREENURINE NOT DETECTED 09/22/2021    ETOH <10 09/22/2021     No results found for: TSH, FREET4  No results found for: LITHIUM  No results found for: VALPROATE, CBMZ        Treatment Plan:  Reviewed current Medications with the patient. Risks, benefits, side effects, drug-to-drug interactions and alternatives to treatment were discussed. Collateral information:   CD evaluation  Encourage patient to attend group and other milieu activities.   Discharge planning discussed with the patient and treatment team.    Abilify 5 mg daily  Trileptal 300 mg twice daily for mood stabilization      PSYCHOTHERAPY/COUNSELING:  [x] Therapeutic interview  [x] Supportive  [] CBT  [] Ongoing  [] Other    [x] Patient continues to need, on a daily basis, active treatment furnished directly by or requiring the supervision of inpatient psychiatric personnel      Anticipated Length of stay: 3 to 7 days based on stability            Electronically signed by CALE Gary CNP on 8/92/1367 at 2:23 PM

## 2021-09-24 NOTE — GROUP NOTE
Group Therapy Note    Date: 9/24/2021    Group Start Time: 1000  Group End Time: 6157  Group Topic: Psychoeducation    [facility]    Delphine Abdi, CTRS        Group Therapy Note      Number of participants: 10  Type of group: Psychoeducation  Mode of intervention: Education, Support, Socialization, Exploration, Clarifying, and Problem-solving  Topic: Mental Health Maintenance Plan  Objective: Pt will develop and identify 1 way to implement maintenance plan in recovery. Notes:   Pt was interactive during group developing and identifying 1 way to implement maintenance plan. Pt gave support and feedback to others. Status After Intervention:  Improved    Participation Level:  Active Listener and Interactive    Participation Quality: Appropriate, Attentive, Sharing and Supportive      Speech:  normal      Thought Process/Content: Logical      Affective Functioning: Congruent      Mood: euthymic      Level of consciousness:  Alert, Oriented x4 and Attentive      Response to Learning: Able to verbalize current knowledge/experience, Able to verbalize/acknowledge new learning, Able to retain information, Capable of insight, Able to change behavior and Progressing to goal      Endings: None Reported    Modes of Intervention: Education, Support, Socialization, Exploration, Clarifying, Problem-solving and Activity

## 2021-09-24 NOTE — BH NOTE
[facility]  Initial Interdisciplinary Treatment Plan NOTE    Review Date & Time: 9/24/21 6073    Patient was in treatment team    Admission Type:   Admission Type:  Involuntary    Reason for admission:  Reason for Admission: \"I'm here because I swollowed a bunch of pills because I was mad\"      Estimated Length of Stay Update:  5-7 days  Estimated Discharge Date Update: 5 days    EDUCATION:   Learner Progress Toward Treatment Goals: Reviewed results and recommendations of this team, Reviewed group plan and strategies, Reviewed signs, symptoms and risk of self harm and violent behavior and Reviewed goals and plan of care    Method: Small group    Outcome: Verbalized understanding    PATIENT GOALS: Make my bed    PLAN/TREATMENT RECOMMENDATIONS UPDATE:3 day    GOALS UPDATE:   Time frame for Short-Term Goals: today    Roxana Cantrell RN

## 2021-09-24 NOTE — PROGRESS NOTES
Attended morning community meeting. Updated on staffing and daily expectations. Shared goal for the day as to make my bed.

## 2021-09-24 NOTE — PLAN OF CARE
Problem: Depressive Behavior With or Without Suicide Precautions:  Goal: Able to verbalize acceptance of life and situations over which he or she has no control  Description: Able to verbalize acceptance of life and situations over which he or she has no control  Outcome: Ongoing     Problem: Depressive Behavior With or Without Suicide Precautions:  Goal: Able to verbalize and/or display a decrease in depressive symptoms  Description: Able to verbalize and/or display a decrease in depressive symptoms  Outcome: Ongoing      Patient has been out on the unit using the phone and watching tv. Pleasant and cooperative during conversation. States her day has been \"good\". Denies suicidal/homicidal ideations and hallucinations at this time. Purposeful rounding continued.

## 2021-09-25 PROCEDURE — 6370000000 HC RX 637 (ALT 250 FOR IP): Performed by: PSYCHIATRY & NEUROLOGY

## 2021-09-25 PROCEDURE — 99231 SBSQ HOSP IP/OBS SF/LOW 25: CPT | Performed by: NURSE PRACTITIONER

## 2021-09-25 PROCEDURE — 1240000000 HC EMOTIONAL WELLNESS R&B

## 2021-09-25 PROCEDURE — 6370000000 HC RX 637 (ALT 250 FOR IP): Performed by: NURSE PRACTITIONER

## 2021-09-25 RX ADMIN — LOSARTAN POTASSIUM 100 MG: 50 TABLET, FILM COATED ORAL at 09:24

## 2021-09-25 RX ADMIN — OXCARBAZEPINE 300 MG: 300 TABLET, FILM COATED ORAL at 22:05

## 2021-09-25 RX ADMIN — ATENOLOL 50 MG: 50 TABLET ORAL at 11:44

## 2021-09-25 RX ADMIN — HYDROCHLOROTHIAZIDE 25 MG: 25 TABLET ORAL at 09:24

## 2021-09-25 RX ADMIN — PANTOPRAZOLE SODIUM 20 MG: 20 TABLET, DELAYED RELEASE ORAL at 06:48

## 2021-09-25 RX ADMIN — OXCARBAZEPINE 300 MG: 300 TABLET, FILM COATED ORAL at 09:24

## 2021-09-25 RX ADMIN — ARIPIPRAZOLE 5 MG: 5 TABLET ORAL at 09:24

## 2021-09-25 RX ADMIN — SULFASALAZINE 500 MG: 500 TABLET ORAL at 22:05

## 2021-09-25 RX ADMIN — TRAZODONE HYDROCHLORIDE 50 MG: 50 TABLET ORAL at 22:05

## 2021-09-25 RX ADMIN — SULFASALAZINE 500 MG: 500 TABLET ORAL at 08:00

## 2021-09-25 ASSESSMENT — PAIN SCALES - GENERAL
PAINLEVEL_OUTOF10: 0

## 2021-09-25 NOTE — PROGRESS NOTES
Group Therapy Note  Date: 9/25/2021  Start Time: 10:00  End Time:  10:45  Number of Participants: 11    Type of Group: Psychoeducation    Wellness Binder Information  Module Name: Self-care    Patient's Goal: To id positive ways to take care of oneself to improve wellness recovery. Notes: Attended group and was able to participate in discussion. Status After Intervention:  Improved    Participation Level:  Active Listener and Interactive    Participation Quality: Attentive and Sharing      Speech:  hesitant      Thought Process/Content: Linear      Affective Functioning: Flat      Mood: depressed      Level of consciousness:  Alert      Response to Learning: Progressing to goal      Endings: None Reported    Modes of Intervention: Education      Discipline Responsible: Psychoeducational Specialist      Signature:  GISSEL Zhu

## 2021-09-25 NOTE — PROGRESS NOTES
[email or web address]    Smokeless tobacco: Never Used   Substance and Sexual Activity    Alcohol use: No    Drug use: No    Sexual activity: Yes   Other Topics Concern    Not on file   Social History Narrative    Not on file     Social Determinants of Health     Financial Resource Strain:     Difficulty of Paying Living Expenses:    Food Insecurity:     Worried About Running Out of Food in the Last Year:     [address] in the Last Year:    Transportation Needs:     Lack of Transportation (Medical):  Lack of Transportation (Non-Medical):    Physical Activity:     Days of Exercise per Week:     Minutes of Exercise per Session:    Stress:     Feeling of Stress :    Social Connections:     Frequency of Communication with Friends and Family:     Frequency of Social Gatherings with Friends and Family:     Attends Shinto Services:     Active Member of Clubs or Organizations:     Attends Club or Organization Meetings:     Marital Status:    Intimate Partner Violence:     Fear of Current or Ex-Partner:     Emotionally Abused:     Physically Abused:     Sexually Abused:            ROS:  [x] All negative/unchanged except if checked.  Explain positive(checked items) below:  [] Constitutional  [] Eyes  [] Ear/Nose/Mouth/Throat  [] Respiratory  [] CV  [] GI  []   [] Musculoskeletal  [] Skin/Breast  [] Neurological  [] Endocrine  [] Heme/Lymph  [] Allergic/Immunologic    Explanation:     MEDICATIONS:    Current Facility-Administered Medications:     atenolol (TENORMIN) tablet 50 mg, 50 mg, Oral, Daily, CALE Jacobs CNP, 50 mg at 21 1144    acetaminophen (TYLENOL) tablet 650 mg, 650 mg, Oral, Q6H PRN, Andree Conroy MD, 650 mg at 21 0754    magnesium hydroxide (MILK OF MAGNESIA) 400 MG/5ML suspension 30 mL, 30 mL, Oral, Daily PRN, Andree Conroy MD    nicotine (NICODERM CQ) 21 MG/24HR 1 patch, 1 patch, TransDERmal, Daily, Andree Conroy MD    aluminum & magnesium hydroxide-simethicone (MAALOX) 200-200-20 MG/5ML suspension 30 mL, 30 mL, Oral, PRN, Guerda Aguilera MD    hydrOXYzine (VISTARIL) capsule 50 mg, 50 mg, Oral, TID PRN, Guerda Aguilera MD    haloperidol (HALDOL) tablet 5 mg, 5 mg, Oral, Q6H PRN **OR** haloperidol lactate (HALDOL) injection 5 mg, 5 mg, IntraMUSCular, Q6H PRN, Guerda Aguilera MD    traZODone (DESYREL) tablet 50 mg, 50 mg, Oral, Nightly PRN, Guerda Aguilera MD, 50 mg at 09/24/21 2113    pantoprazole (PROTONIX) tablet 20 mg, 20 mg, Oral, Daily, Nahid Oats, APRN - CNP, 20 mg at 09/25/21 0289    sulfaSALAzine (AZULFIDINE) tablet 500 mg, 500 mg, Oral, BID WC, Catalina Perry APRN - CNP, 239 mg at 09/25/21 0800    losartan (COZAAR) tablet 100 mg, 100 mg, Oral, Daily, 100 mg at 09/25/21 6881 **AND** hydroCHLOROthiazide (HYDRODIURIL) tablet 25 mg, 25 mg, Oral, Daily, Catalina Mushtaq Perry, APRN - CNP, 25 mg at 09/25/21 0924    OXcarbazepine (TRILEPTAL) tablet 300 mg, 300 mg, Oral, BID, Catalina Mushtaq Perry, APRN - CNP, 209 mg at 09/25/21 3448    ARIPiprazole (ABILIFY) tablet 5 mg, 5 mg, Oral, Daily, Catalina Mushtaq Kinglicthuy, APRN - CNP, 5 mg at 09/25/21 0882      Examination:  BP (!) 148/84   Pulse 78   Temp 97.4 °F (36.3 °C) (Temporal)   Resp 14   Wt 257 lb (116.6 kg)   SpO2 98%   BMI 41.48 kg/m²   Gait - steady  Medication side effects(SE): denies    Mental Status Examination:    Level of consciousness:  within normal limits   Appearance:  fair grooming and fair hygiene  Behavior/Motor:  no abnormalities noted  Attitude toward examiner:  cooperative  Speech:  spontaneous, normal rate and normal volume   Mood: \" I am just tired. \"  Affect: Appropriate and pleasant  Thought processes: Linear without flight of ideas loose associations  Thought content: Devoid of any auditory visual loose Nations delusions or other perceptual abnormalities.   Denies SI/HI intent or plan  Cognition:  oriented to person, place, and time   Concentration intact  Insight poor Judgement poor     ASSESSMENT:   Patient symptoms are:  [] Well controlled  [x] Improving  [] Worsening  [] No change      Diagnosis:   Principal Problem: Moderate mixed bipolar II disorder (HCC)  Resolved Problems:    * No resolved hospital problems. *      LABS:    Recent Labs     09/22/21  1524   WBC 5.3   HGB 10.7*        Recent Labs     09/22/21  1524      K 4.0      CO2 25   BUN 17   CREATININE 0.7   GLUCOSE 89     Recent Labs     09/22/21  1524   BILITOT 0.4   ALKPHOS 93   AST 20   ALT 18     Lab Results   Component Value Date    LABAMPH NOT DETECTED 09/22/2021    BARBSCNU NOT DETECTED 09/22/2021    LABBENZ POSITIVE 09/22/2021    LABMETH NOT DETECTED 09/22/2021    OPIATESCREENURINE NOT DETECTED 09/22/2021    PHENCYCLIDINESCREENURINE NOT DETECTED 09/22/2021    ETOH <10 09/22/2021     No results found for: TSH, FREET4  No results found for: LITHIUM  No results found for: VALPROATE, CBMZ        Treatment Plan:  Reviewed current Medications with the patient. Risks, benefits, side effects, drug-to-drug interactions and alternatives to treatment were discussed. Collateral information:   CD evaluation  Encourage patient to attend group and other milieu activities.   Discharge planning discussed with the patient and treatment team.    Abilify 5 mg daily  Trileptal 300 mg twice daily for mood stabilization      PSYCHOTHERAPY/COUNSELING:  [x] Therapeutic interview  [x] Supportive  [] CBT  [] Ongoing  [] Other    [x] Patient continues to need, on a daily basis, active treatment furnished directly by or requiring the supervision of inpatient psychiatric personnel      Anticipated Length of stay: 3 to 7 days based on stability            Electronically signed by CALE Fraser CNP on 9/25/2021 at 1:42 PM

## 2021-09-25 NOTE — PROGRESS NOTES
Patient denies SI,HI, or any Hallucinations at this time. She rates depression 3/10 and denies any anxiety. States she likes it here because she feels she doesn't have to be phony or pretend. States she works for the [employer] for 9 years and that she feels they are trying to find a way to fire her. States she feels it is political reasons because she does an extremely well job. During her assessment she tends to start another topic without completely the first and then back to the first. She states that she took a handful of xanax at work and washed it down with cough syrup. She then stated her employer called the ambulance as she was having problems walking.  Tends to minimize the reason of her admission or why she attempted

## 2021-09-25 NOTE — PLAN OF CARE
Problem: Depressive Behavior With or Without Suicide Precautions:  Goal: Able to verbalize acceptance of life and situations over which he or she has no control  Description: Able to verbalize acceptance of life and situations over which he or she has no control  9/25/2021 1128 by Amaya Demarco RN  Outcome: Ongoing  9/25/2021 0409 by Rennie Phoenix, RN  Outcome: Ongoing  Goal: Able to verbalize and/or display a decrease in depressive symptoms  Description: Able to verbalize and/or display a decrease in depressive symptoms  9/25/2021 1128 by Amaya Demarco RN  Outcome: Ongoing  9/25/2021 0409 by Rennie Phoenix, RN  Outcome: Ongoing  Goal: Ability to disclose and discuss suicidal ideas will improve  Description: Ability to disclose and discuss suicidal ideas will improve  9/25/2021 1128 by Amaya Demarco RN  Outcome: Met This Shift  9/25/2021 0409 by Rennie Phoenix, RN  Outcome: Ongoing  Goal: Able to verbalize support systems  Description: Able to verbalize support systems  9/25/2021 1128 by Amaya Demarco RN  Outcome: Ongoing  9/25/2021 0409 by Rennie Phoenix, RN  Outcome: Ongoing  Goal: Absence of self-harm  Description: Absence of self-harm  9/25/2021 1128 by Amaya Demarco RN  Outcome: Ongoing  9/25/2021 0409 by Rennie Phoenix, RN  Outcome: Met This Shift  Goal: Patient specific goal  Description: Patient specific goal  Outcome: Ongoing  Goal: Participates in care planning  Description: Participates in care planning  9/25/2021 1128 by Amaya Demarco RN  Outcome: Ongoing  9/25/2021 0409 by Rennie Phoenix, RN  Outcome: Ongoing

## 2021-09-26 LAB
EKG ATRIAL RATE: 56 BPM
EKG P AXIS: 20 DEGREES
EKG P-R INTERVAL: 124 MS
EKG Q-T INTERVAL: 444 MS
EKG QRS DURATION: 90 MS
EKG QTC CALCULATION (BAZETT): 428 MS
EKG R AXIS: 28 DEGREES
EKG T AXIS: 34 DEGREES
EKG VENTRICULAR RATE: 56 BPM

## 2021-09-26 PROCEDURE — 6370000000 HC RX 637 (ALT 250 FOR IP): Performed by: NURSE PRACTITIONER

## 2021-09-26 PROCEDURE — 99231 SBSQ HOSP IP/OBS SF/LOW 25: CPT | Performed by: NURSE PRACTITIONER

## 2021-09-26 PROCEDURE — 93010 ELECTROCARDIOGRAM REPORT: CPT | Performed by: INTERNAL MEDICINE

## 2021-09-26 PROCEDURE — 6370000000 HC RX 637 (ALT 250 FOR IP): Performed by: PSYCHIATRY & NEUROLOGY

## 2021-09-26 PROCEDURE — 1240000000 HC EMOTIONAL WELLNESS R&B

## 2021-09-26 RX ORDER — PANTOPRAZOLE SODIUM 40 MG/1
40 TABLET, DELAYED RELEASE ORAL
Status: DISCONTINUED | OUTPATIENT
Start: 2021-09-26 | End: 2021-09-27 | Stop reason: HOSPADM

## 2021-09-26 RX ORDER — CETIRIZINE HYDROCHLORIDE 10 MG/1
10 TABLET ORAL DAILY
Status: DISCONTINUED | OUTPATIENT
Start: 2021-09-26 | End: 2021-09-27 | Stop reason: HOSPADM

## 2021-09-26 RX ADMIN — OXCARBAZEPINE 300 MG: 300 TABLET, FILM COATED ORAL at 20:16

## 2021-09-26 RX ADMIN — OXCARBAZEPINE 300 MG: 300 TABLET, FILM COATED ORAL at 08:24

## 2021-09-26 RX ADMIN — PANTOPRAZOLE SODIUM 20 MG: 20 TABLET, DELAYED RELEASE ORAL at 06:31

## 2021-09-26 RX ADMIN — CETIRIZINE HYDROCHLORIDE 10 MG: 10 TABLET, FILM COATED ORAL at 16:07

## 2021-09-26 RX ADMIN — SULFASALAZINE 500 MG: 500 TABLET ORAL at 20:17

## 2021-09-26 RX ADMIN — HYDROCHLOROTHIAZIDE 25 MG: 25 TABLET ORAL at 08:24

## 2021-09-26 RX ADMIN — PANTOPRAZOLE SODIUM 40 MG: 40 TABLET, DELAYED RELEASE ORAL at 16:07

## 2021-09-26 RX ADMIN — SULFASALAZINE 500 MG: 500 TABLET ORAL at 08:24

## 2021-09-26 RX ADMIN — LOSARTAN POTASSIUM 100 MG: 50 TABLET, FILM COATED ORAL at 08:24

## 2021-09-26 RX ADMIN — ARIPIPRAZOLE 5 MG: 5 TABLET ORAL at 08:23

## 2021-09-26 RX ADMIN — TRAZODONE HYDROCHLORIDE 50 MG: 50 TABLET ORAL at 20:17

## 2021-09-26 RX ADMIN — ATENOLOL 50 MG: 50 TABLET ORAL at 08:23

## 2021-09-26 ASSESSMENT — PAIN SCALES - GENERAL: PAINLEVEL_OUTOF10: 0

## 2021-09-26 NOTE — PROGRESS NOTES
Patient was out on unit,alert and oriented, calm,cooperative,pleasant and cooperative. Denies SI,HI or AV hallucinations. Verbalizes that she no longer feels depression or anxious. States that she still feels that her coworkers and boss are out to get her fired. Verbalizes appetite is increased. States that she struggles going to sleep,but ok once asleep. No longer having thoughts of harm. \"I get dizzy. I don't know if from medication. This is new sypmtom to me. \"  \"I'm actually thinking about getting an . I look at what this has done to me now I'm here. \"  Medication compliant. Attends group. Will continue to monitor.

## 2021-09-26 NOTE — PROGRESS NOTES
BEHAVIORAL HEALTH FOLLOW-UP NOTE     9/26/2021     Patient was seen and examined in person, Chart reviewed   Patient's case discussed with staff/team    Chief Complaint: \" I am still doing well, been a little dizzy at times\"    Interim History: Patient assessed up in the dayroom. She is social with peers, participating in her care, she is taking her medications and attending groups. She denies SI/HI intent or plan denies any auditory visualizations states the medications are making her tired. She is demonstrating improved insight into need for treatment. She states that she enjoys the milieu and the groups and is learning a lot from her peers. She has been observed to he helpful and supportive to her peers on the unit. She does not appear paranoid or internally stimulated. She has not made any statements about her co-workers.      Appetite:   [x] Normal/Unchanged  [] Increased  [] Decreased      Sleep:       [x] Normal/Unchanged  [] Fair       [] Poor              Energy:    [x] Normal/Unchanged  [] Increased  [] Decreased        SI [] Present  [x] Absent    HI  []Present  [x] Absent     Aggression:  [] yes  [x] no    Patient is [x] able  [] unable to CONTRACT FOR SAFETY     PAST MEDICAL/PSYCHIATRIC HISTORY:   Past Medical History:   Diagnosis Date    Acid reflux     Adenocarcinoma in situ of cervix 11/28/2017    s/p Hyst     Apnea     Basal cell carcinoma of forearm, left     Colitis, chronic, ulcerative ([facility] [address]     Hypertension     Sarcoidosis     Sleep apnea        FAMILY/SOCIAL HISTORY:  Family History   Problem Relation Age of Onset    Cancer Maternal Grandfather         Bladder    Ovarian Cancer Maternal Aunt     Colon Cancer Neg Hx     Uterine Cancer Neg Hx     Breast Cancer Neg Hx      Social History     Socioeconomic History    Marital status: Single     Spouse name: Not on file    Number of children: Not on file    Years of education: Not on file    Highest education level: Not on file Occupational History    Not on file   Tobacco Use    Smoking status: Former Smoker     Packs/day: 0.50     Years: 30.00     Pack years: 15.00     Quit date:      Years since quittin.7    Smokeless tobacco: Never Used   Substance and Sexual Activity    Alcohol use: No    Drug use: No    Sexual activity: Yes   Other Topics Concern    Not on file   Social History Narrative    Not on file     Social Determinants of Health     Financial Resource Strain:     Difficulty of Paying Living Expenses:    Food Insecurity:     Worried About Running Out of Food in the Last Year:     [address] in the Last Year:    Transportation Needs:     Lack of Transportation (Medical):  Lack of Transportation (Non-Medical):    Physical Activity:     Days of Exercise per Week:     Minutes of Exercise per Session:    Stress:     Feeling of Stress :    Social Connections:     Frequency of Communication with Friends and Family:     Frequency of Social Gatherings with Friends and Family:     Attends Scientologist Services:     Active Member of Clubs or Organizations:     Attends Club or Organization Meetings:     Marital Status:    Intimate Partner Violence:     Fear of Current or Ex-Partner:     Emotionally Abused:     Physically Abused:     Sexually Abused:            ROS:  [x] All negative/unchanged except if checked.  Explain positive(checked items) below:  [] Constitutional  [] Eyes  [] Ear/Nose/Mouth/Throat  [] Respiratory  [] CV  [] GI  []   [] Musculoskeletal  [] Skin/Breast  [] Neurological  [] Endocrine  [] Heme/Lymph  [] Allergic/Immunologic    Explanation:     MEDICATIONS:    Current Facility-Administered Medications:     nicotine polacrilex (NICORETTE) gum 4 mg, 4 mg, Oral, PRN, Pecramos Bermudez APRN - CNP    atenolol (TENORMIN) tablet 50 mg, 50 mg, Oral, Daily, CALE Disla - CNP, 50 mg at 21 0823    acetaminophen (TYLENOL) tablet 650 mg, 650 mg, Oral, Q6H PRN, Elizabeth Christine MD, 650 mg at 09/24/21 0754    magnesium hydroxide (MILK OF MAGNESIA) 400 MG/5ML suspension 30 mL, 30 mL, Oral, Daily PRN, Leopold Garner, MD    aluminum & magnesium hydroxide-simethicone (MAALOX) 200-200-20 MG/5ML suspension 30 mL, 30 mL, Oral, PRN, Leopold Garner, MD    hydrOXYzine (VISTARIL) capsule 50 mg, 50 mg, Oral, TID PRN, Leopold Garner, MD    haloperidol (HALDOL) tablet 5 mg, 5 mg, Oral, Q6H PRN **OR** haloperidol lactate (HALDOL) injection 5 mg, 5 mg, IntraMUSCular, Q6H PRN, Leopold Garner, MD    traZODone (DESYREL) tablet 50 mg, 50 mg, Oral, Nightly PRN, Leopold Garner, MD, 50 mg at 09/25/21 2205    pantoprazole (PROTONIX) tablet 20 mg, 20 mg, Oral, Daily, CALE Crespo CNP, 20 mg at 09/26/21 0631    sulfaSALAzine (AZULFIDINE) tablet 500 mg, 500 mg, Oral, BID WC, CALE Reyes CNP, 118 mg at 09/26/21 0176    losartan (COZAAR) tablet 100 mg, 100 mg, Oral, Daily, 100 mg at 09/26/21 0824 **AND** hydroCHLOROthiazide (HYDRODIURIL) tablet 25 mg, 25 mg, Oral, Daily, CALE Crespo CNP, 25 mg at 09/26/21 0824    OXcarbazepine (TRILEPTAL) tablet 300 mg, 300 mg, Oral, BID, CALE Reyes CNP, 922 mg at 09/26/21 0824    ARIPiprazole (ABILIFY) tablet 5 mg, 5 mg, Oral, Daily, CALE Reyes CNP, 5 mg at 09/26/21 2520      Examination:  BP (!) 161/77   Pulse 82   Temp 97.1 °F (36.2 °C)   Resp 16   Wt 257 lb (116.6 kg)   SpO2 97%   BMI 41.48 kg/m²   Gait - steady  Medication side effects(SE): denies    Mental Status Examination:    Level of consciousness:  within normal limits   Appearance:  fair grooming and fair hygiene  Behavior/Motor:  no abnormalities noted  Attitude toward examiner:  cooperative  Speech:  spontaneous, normal rate and normal volume   Mood: \" I am just tired. \"  Affect: Appropriate and pleasant  Thought processes: Linear without flight of ideas loose associations  Thought content: Devoid of any auditory visual loose [place] delusions or other perceptual abnormalities. Denies SI/HI intent or plan  Cognition:  oriented to person, place, and time   Concentration intact  Insight poor   Judgement poor     ASSESSMENT:   Patient symptoms are:  [] Well controlled  [x] Improving  [] Worsening  [] No change      Diagnosis:   Principal Problem: Moderate mixed bipolar II disorder (HCC)  Resolved Problems:    * No resolved hospital problems. *      LABS:    No results for input(s): WBC, HGB, PLT in the last 72 hours. No results for input(s): NA, K, CL, CO2, BUN, CREATININE, GLUCOSE in the last 72 hours. No results for input(s): BILITOT, ALKPHOS, AST, ALT in the last 72 hours. Lab Results   Component Value Date    LABAMPH NOT DETECTED 09/22/2021    BARBSCNU NOT DETECTED 09/22/2021    LABBENZ POSITIVE 09/22/2021    LABMETH NOT DETECTED 09/22/2021    OPIATESCREENURINE NOT DETECTED 09/22/2021    PHENCYCLIDINESCREENURINE NOT DETECTED 09/22/2021    ETOH <10 09/22/2021     No results found for: TSH, FREET4  No results found for: LITHIUM  No results found for: VALPROATE, CBMZ        Treatment Plan:  Reviewed current Medications with the patient. Risks, benefits, side effects, drug-to-drug interactions and alternatives to treatment were discussed. Collateral information:   CD evaluation  Encourage patient to attend group and other milieu activities.   Discharge planning discussed with the patient and treatment team.    Abilify 5 mg daily  Trileptal 300 mg twice daily for mood stabilization      PSYCHOTHERAPY/COUNSELING:  [x] Therapeutic interview  [x] Supportive  [] CBT  [] Ongoing  [] Other    [x] Patient continues to need, on a daily basis, active treatment furnished directly by or requiring the supervision of inpatient psychiatric personnel      Anticipated Length of stay: 3 to 7 days based on stability            Electronically signed by CALE Julio CNP on 9/26/2021 at 1:34 PM

## 2021-09-26 NOTE — GROUP NOTE
Group Therapy Note    Date: 9/26/2021    Group Start Time: 1100  Group End Time: 1130  Group Topic: Psychotherapy    [facility]    JENNIFER Kulkarni LSW        Group Therapy Note    Attendees:          Patient's Goal:   Understanding and processing trauma    Notes:  Patient had a good understanding and participation in group    Status After Intervention:  Improved    Participation Level:  Active Listener and Interactive    Participation Quality: Appropriate, Attentive and Sharing      Speech:  normal      Thought Process/Content: Logical      Affective Functioning: Congruent      Mood: anxious      Level of consciousness:  Alert      Response to Learning: Progressing to goal      Endings: None Reported    Modes of Intervention: Education      Discipline Responsible: /Counselor      Signature:  JENNIFER Kulkarni LSW

## 2021-09-26 NOTE — PLAN OF CARE
Problem: Depressive Behavior With or Without Suicide Precautions:  Goal: Able to verbalize and/or display a decrease in depressive symptoms  Description: Able to verbalize and/or display a decrease in depressive symptoms  9/26/2021 1845 by Hunter Cabrera RN  Outcome: Met This Shift     Problem: Depressive Behavior With or Without Suicide Precautions:  Goal: Ability to disclose and discuss suicidal ideas will improve  Description: Ability to disclose and discuss suicidal ideas will improve  9/26/2021 1845 by Hunter Cabrera RN  Outcome: Met This Shift   Pt has been out on the unit and social with select female peers. She is pleasant and cooperative during interaction and she states \"I feel good. \"  she denies any depression and suicidal ideations.

## 2021-09-26 NOTE — PROGRESS NOTES
Patient denies  SI,HI, or any Hallucinations at this time. She takes her meds and attends groups. Rates depression 4/10 and denies any anxiety. States she gets dizzy at times and her head is a fog. Complaints of acid reflex and requesting medication. NP aware. Will continue to monitor.

## 2021-09-26 NOTE — PROGRESS NOTES
Group Therapy Note    Date: 9/26/2021  Start Time: 10:00  End Time:  10:45  Number of Participants: 9    Type of Group: Psychoeducation    Wellness Binder Information  Module Name: Building Happiness    Patient's Goal: to id positive ways to build happiness in one's life. Notes: Attended group and was able to participate in discussion. Status After Intervention:  Improved    Participation Level:  Active Listener and Interactive    Participation Quality: Attentive      Speech:  hesitant      Thought Process/Content: Linear      Affective Functioning: Flat      Mood: depressed      Level of consciousness:  Alert      Response to Learning: Progressing to goal      Endings: None Reported    Modes of Intervention: Education      Discipline Responsible: Psychoeducational Specialist      Signature:  GISSEL Chau

## 2021-09-26 NOTE — PLAN OF CARE
Problem: Depressive Behavior With or Without Suicide Precautions:  Goal: Able to verbalize acceptance of life and situations over which he or she has no control  Description: Able to verbalize acceptance of life and situations over which he or she has no control  Outcome: Ongoing  Goal: Able to verbalize and/or display a decrease in depressive symptoms  Description: Able to verbalize and/or display a decrease in depressive symptoms  Outcome: Ongoing  Goal: Ability to disclose and discuss suicidal ideas will improve  Description: Ability to disclose and discuss suicidal ideas will improve  Outcome: Met This Shift  Goal: Able to verbalize support systems  Description: Able to verbalize support systems  Outcome: Met This Shift  Goal: Absence of self-harm  Description: Absence of self-harm  Outcome: Met This Shift  Goal: Patient specific goal  Description: Patient specific goal  Outcome: Met This Shift  Goal: Participates in care planning  Description: Participates in care planning  Outcome: Met This Shift

## 2021-09-27 VITALS
SYSTOLIC BLOOD PRESSURE: 128 MMHG | RESPIRATION RATE: 16 BRPM | BODY MASS INDEX: 41.48 KG/M2 | OXYGEN SATURATION: 97 % | WEIGHT: 257 LBS | DIASTOLIC BLOOD PRESSURE: 75 MMHG | HEART RATE: 88 BPM | TEMPERATURE: 98.4 F

## 2021-09-27 PROCEDURE — 6370000000 HC RX 637 (ALT 250 FOR IP): Performed by: NURSE PRACTITIONER

## 2021-09-27 PROCEDURE — 99239 HOSP IP/OBS DSCHRG MGMT >30: CPT | Performed by: NURSE PRACTITIONER

## 2021-09-27 RX ORDER — OXCARBAZEPINE 300 MG/1
300 TABLET, FILM COATED ORAL 2 TIMES DAILY
Qty: 60 TABLET | Refills: 0 | Status: SHIPPED | OUTPATIENT
Start: 2021-09-27 | End: 2021-10-27

## 2021-09-27 RX ORDER — ARIPIPRAZOLE 5 MG/1
5 TABLET ORAL DAILY
Qty: 30 TABLET | Refills: 0 | Status: SHIPPED | OUTPATIENT
Start: 2021-09-28 | End: 2021-10-28

## 2021-09-27 RX ADMIN — ARIPIPRAZOLE 5 MG: 5 TABLET ORAL at 08:51

## 2021-09-27 RX ADMIN — ATENOLOL 50 MG: 50 TABLET ORAL at 08:48

## 2021-09-27 RX ADMIN — SULFASALAZINE 500 MG: 500 TABLET ORAL at 08:51

## 2021-09-27 RX ADMIN — PANTOPRAZOLE SODIUM 40 MG: 40 TABLET, DELAYED RELEASE ORAL at 06:23

## 2021-09-27 RX ADMIN — PANTOPRAZOLE SODIUM 40 MG: 40 TABLET, DELAYED RELEASE ORAL at 16:39

## 2021-09-27 RX ADMIN — OXCARBAZEPINE 300 MG: 300 TABLET, FILM COATED ORAL at 08:51

## 2021-09-27 RX ADMIN — CETIRIZINE HYDROCHLORIDE 10 MG: 10 TABLET, FILM COATED ORAL at 08:48

## 2021-09-27 RX ADMIN — LOSARTAN POTASSIUM 100 MG: 50 TABLET, FILM COATED ORAL at 08:51

## 2021-09-27 RX ADMIN — HYDROCHLOROTHIAZIDE 25 MG: 25 TABLET ORAL at 08:51

## 2021-09-27 ASSESSMENT — PAIN SCALES - GENERAL: PAINLEVEL_OUTOF10: 0

## 2021-09-27 NOTE — PROGRESS NOTES
Patient denies SI, HI, or any Hallucinations at this time. She is out social on the unit, takes her meds and attends groups. Will continue to monitor.

## 2021-09-27 NOTE — CARE COORDINATION
In order to ensure appropriate transition and discharge planning is in place, the following documents have been transmitted to [place], as the new outpatient provider:     The d/c diagnosis was transmitted to the next care provider   The reason for hospitalization was transmitted to the next care provider   The d/c medications (dosage and indication) were transmitted to the next care provider    The continuing care plan was transmitted to the next care provider

## 2021-09-27 NOTE — SUICIDE SAFETY PLAN
SAFETY PLAN    A suicide Safety Plan is a document that supports someone when they are having thoughts of suicide. Warning Signs that indicate a suicidal crisis may be developing: What (situations, thoughts, feelings, body sensations, behaviors, etc.) do you experience that lets you know you are beginning to think about suicide? 1. crying  2. sadness  3. anger    Internal Coping Strategies:  What things can I do (relaxation techniques, hobbies, physical activities, etc.) to take my mind off my problems without contacting another person? 1. music  2. walk  3. driving through he park    People and social settings that provide distraction: Who can I call or where can I go to distract me? 1. Name: Marychuy Alexandra  Phone:  2. Name: Salina Redmond. Phone: [phone]   3. Place: Park            4. Place: back yard    People whom I can ask for help: Who can I call when I need help - for example, friends, family, clergy, someone else? 1. Name: Salina Redmond. Phone: [phone]  2. Name: Marychuy Alexandra  Phone:   3. Name: Fabian Montalvo. Phone:     Professionals or [address] [place] [address] Blvd I can contact during a crisis: Who can I call for help - for example, my doctor, my psychiatrist, my psychologist, a mental health provider, a suicide hotline? 1. Clinician Name: Dr. Caden Marroquin   Phone:       Clinician Pager or Emergency Contact #:     2. Clinician Name: Natalia   Phone:       Clinician Pager or Emergency Contact #:     3. Suicide Prevention Lifeline: 5-002-556-TALK (8893)    4. [address] [place] [address] Emergency Services -  for example, [facility] suicide hotline, [facility] Hotline:       Emergency Services Address:       Emergency Services Phone:     Making the environment safe: How can I make my environment (house/apartment/living space) safer? For example, can I remove guns, medications, and other items? 1. Throw out old meds  2.  No weapons

## 2021-09-27 NOTE — DISCHARGE SUMMARY
DISCHARGE SUMMARY      Patient ID:  Viraj Cabrera  [id number]  45 y.o.  1970    Admit date: 2021    Discharge date and time: 2021    Admitting Physician: Robert Ford MD     Discharge Physician: Dr Cait Fall MD    Discharge Diagnoses:   Patient Active Problem List   Diagnosis    Adenocarcinoma in situ (AIS) of uterine cervix    Moderate mixed bipolar II disorder ([facility] [address]       Admission Condition: poor    Discharged Condition: stable    Admission Circumstance: present to the ED for an intentional drug overdose after taking 10 Xanax pills and 3 gulps of NyQuil while at work in an attempt to kill her self    PAST MEDICAL/PSYCHIATRIC HISTORY:   Past Medical History:   Diagnosis Date    Acid reflux     Adenocarcinoma in situ of cervix 2017    s/p Hyst     Apnea     Basal cell carcinoma of forearm, left     Colitis, chronic, ulcerative ([facility] [address]     Hypertension     Sarcoidosis     Sleep apnea        FAMILY/SOCIAL HISTORY:  Family History   Problem Relation Age of Onset    Cancer Maternal Grandfather         Bladder    Ovarian Cancer Maternal Aunt     Colon Cancer Neg Hx     Uterine Cancer Neg Hx     Breast Cancer Neg Hx      Social History     Socioeconomic History    Marital status: Single     Spouse name: Not on file    Number of children: Not on file    Years of education: Not on file    Highest education level: Not on file   Occupational History    Not on file   Tobacco Use    Smoking status: Former Smoker     Packs/day: 0.50     Years: 30.00     Pack years: 15.00     Quit date:      Years since quittin.7    Smokeless tobacco: Never Used   Substance and Sexual Activity    Alcohol use: No    Drug use: No    Sexual activity: Yes   Other Topics Concern    Not on file   Social History Narrative    Not on file     Social Determinants of Health     Financial Resource Strain:     Difficulty of Paying Living Expenses:    Food Insecurity:     Worried About Running Out of Food in the Last Year:    [address] in the Last Year:    Transportation Needs:     Lack of Transportation (Medical):      Lack of Transportation (Non-Medical):    Physical Activity:     Days of Exercise per Week:     Minutes of Exercise per Session:    Stress:     Feeling of Stress :    Social Connections:     Frequency of Communication with Friends and Family:     Frequency of Social Gatherings with Friends and Family:     Attends Jehov[employer]'s witness Services:     Active Member of Clubs or Organizations:     Attends Club or Organization Meetings:     Marital Status:    Intimate Partner Violence:     Fear of Current or Ex-Partner:     Emotionally Abused:     Physically Abused:     Sexually Abused:        MEDICATIONS:    Current Facility-Administered Medications:     nicotine polacrilex (NICORETTE) gum 4 mg, 4 mg, Oral, PRN, Javad Servin APRN - CNP    cetirizine (ZYRTEC) tablet 10 mg, 10 mg, Oral, Daily, Javad Servin APRN - CNP, 10 mg at 09/27/21 0848    pantoprazole (PROTONIX) tablet 40 mg, 40 mg, Oral, BID AC, CALE Greenberg - CNP, 40 mg at 09/27/21 7016    atenolol (TENORMIN) tablet 50 mg, 50 mg, Oral, Daily, Skylar Perry, APRN - CNP, 50 mg at 09/27/21 0848    acetaminophen (TYLENOL) tablet 650 mg, 650 mg, Oral, Q6H PRN, Pebbles Gutierrez MD, 650 mg at 09/24/21 0754    magnesium hydroxide (MILK OF MAGNESIA) 400 MG/5ML suspension 30 mL, 30 mL, Oral, Daily PRN, Pebbles Gutierrez MD    aluminum & magnesium hydroxide-simethicone (MAALOX) 200-200-20 MG/5ML suspension 30 mL, 30 mL, Oral, PRN, Pebbles Gutierrez MD    hydrOXYzine (VISTARIL) capsule 50 mg, 50 mg, Oral, TID PRN, Pebbles Gutierrez MD    haloperidol (HALDOL) tablet 5 mg, 5 mg, Oral, Q6H PRN **OR** haloperidol lactate (HALDOL) injection 5 mg, 5 mg, IntraMUSCular, Q6H PRN, Pebbles Gutierrez MD    traZODone (DESYREL) tablet 50 mg, 50 mg, Oral, Nightly PRN, Pebbles Gutiererz MD, 50 mg at 09/26/21 2017    sulfaSALAzine (AZULFIDINE) tablet 500 mg, 500 mg, Oral, BID WC, Catalina Kinglick, APRN - CNP, 700 mg at 09/27/21 0851    losartan (COZAAR) tablet 100 mg, 100 mg, Oral, Daily, 100 mg at 09/27/21 0851 **AND** hydroCHLOROthiazide (HYDRODIURIL) tablet 25 mg, 25 mg, Oral, Daily, Catalina Konig Dellick, APRN - CNP, 25 mg at 09/27/21 0851    OXcarbazepine (TRILEPTAL) tablet 300 mg, 300 mg, Oral, BID, Catalina Konandrwe Dellick, APRN - CNP, 863 mg at 09/27/21 0851    ARIPiprazole (ABILIFY) tablet 5 mg, 5 mg, Oral, Daily, Catalina Konandrew Dellick, APRN - CNP, 5 mg at 09/27/21 1432    Examination:  BP (!) 158/82   Pulse 88   Temp 97 °F (36.1 °C) (Temporal)   Resp 14   Wt 257 lb (116.6 kg)   SpO2 97%   BMI 41.48 kg/m²   Gait - steady    HOSPITAL COURSE[de-identified]  Patient was admitted to the unit on 9/22/2021 was closely monitored for suicidal homicidal ideations. She was evaluated and treated with Abilify 5 mg daily, Trileptal 300 mg twice daily for mood stabilization. Medical events were insignificant and patient continued to improve on the floor. She started coming out of her room she was attending groups to socializing with peers. She never made any suicidal statements or any suicidal gestures while in the unit. Social workers obtain confirmation patient's  who was able to voice any concerns that he had. He reported no safety concerns no access to any guns. Social workers performed a duty to warn to the fact the patient had made threats against her coworkers while in the ED prior to her admission. Treatment team felt the patient obtain the maximum benefit for her hospitalization She was set up with an outpatient mental health agency for outpatient follow-up services . At the time of discharge patient  did not show impulsive behavior. She was up on the unit she was attending groups and socializing with peers. She vehemently denied any suicidal homicidal ideations intent or plan.   She was eating well and sleeping well there are no neurovegetative signs or symptoms of depression she denied any auditory visualizations. There are no overt or covert signs psychosis. She was appreciative of the help that she received here. This patient no longer meets criteria for inpatient hospitalization. No AVH or paranoid thoughts  No acute hopeless or worthless feeling  No active SI/HI  Appetite:  [x] Normal  [] Increased  [] Decreased    Sleep:       [x] Normal  [] Fair       [] Poor            Energy:    [x] Normal  [] Increased  [] Decreased     SI [] Present  [x] Absent  HI  []Present  [x] Absent   Aggression:  [] yes  [x] no  Patient is [x] able  [] unable to CONTRACT FOR SAFETY   Medication side effects(SE):  [x] None(Psych. Meds.) [] Other      Mental Status Examination on discharge:    Level of consciousness:  within normal limits   Appearance:  well-appearing  Behavior/Motor:  no abnormalities noted  Attitude toward examiner:  attentive and good eye contact  Speech:  spontaneous, normal rate and normal volume   Mood: \" My mood is good. \"  Affect: Bright appropriate and pleasant  Thought processes: Linear without flight of ideas loose associations  Thought content: Devoid of any auditory visual loose Nations delusions or other perceptual normalities. Denies SI/HI intent or plan  Cognition:  oriented to person, place, and time   Concentration intact  Memory intact  Insight good   Judgement fair   Fund of Knowledge adequate      ASSESSMENT:  Patient symptoms are:  [x] Well controlled  [x] Improving  [] Worsening  [] No change    Reason for more than one antipsychotic:  [x] N/A  [] 3 Failed Monotherapy attempts (Drugs tried:)  [] Crossover to a new antipsychotic  [] Taper to Monotherapy from Polypharmacy  [] Augmentation of clozapine therapy due to treatment resistance to single therapy    Diagnosis:  Principal Problem: Moderate mixed bipolar II disorder (HCC)  Resolved Problems:    * No resolved hospital problems.  *      LABS:    No results for input(s): WBC, HGB, PLT in the last 72 hours. No results for input(s): NA, K, CL, CO2, BUN, CREATININE, GLUCOSE in the last 72 hours. No results for input(s): BILITOT, ALKPHOS, AST, ALT in the last 72 hours. Lab Results   Component Value Date    LABAMPH NOT DETECTED 09/22/2021    BARBSCNU NOT DETECTED 09/22/2021    LABBENZ POSITIVE 09/22/2021    LABMETH NOT DETECTED 09/22/2021    OPIATESCREENURINE NOT DETECTED 09/22/2021    PHENCYCLIDINESCREENURINE NOT DETECTED 09/22/2021    ETOH <10 09/22/2021     No results found for: TSH, FREET4  No results found for: LITHIUM  No results found for: VALPROATE, CBMZ    RISK ASSESSMENT AT DISCHARGE: Low risk for suicide and homicide. Treatment Plan:  Reviewed current Medications with the patient. Education provided on the complaince with treatment. Risks, benefits, side effects, drug-to-drug interactions and alternatives to treatment were discussed. Encourage patient to attend outpatient follow up appointment and therapy. Patient was advised to call the outpatient provider, visit the nearest ED or call 911 if symptoms are not manageable. Patient's family member was contacted prior to the discharge.          Medication List      START taking these medications    ARIPiprazole 5 MG tablet  Commonly known as: ABILIFY  Take 1 tablet by mouth daily  Start taking on: September 28, 2021     nicotine polacrilex 4 MG gum  Commonly known as: NICORETTE  Take 1 each by mouth as needed for Smoking cessation     OXcarbazepine 300 MG tablet  Commonly known as: TRILEPTAL  Take 1 tablet by mouth 2 times daily        CONTINUE taking these medications    ATENOLOL PO     hydroCHLOROthiazide 12.5 MG tablet  Commonly known as: HYDRODIURIL     losartan-hydroCHLOROthiazide 100-25 MG per tablet  Commonly known as: HYZAAR     omeprazole 20 MG delayed release capsule  Commonly known as: PRILOSEC     sulfaSALAzine 500 MG tablet  Commonly known as: AZULFIDINE        STOP taking these medications    Paxil 10 MG tablet  Generic drug: PARoxetine           Where to Get Your Medications      These medications were [facility] to [facility] [address] [place] [facility] [address]    Phone: [phone]   · ARIPiprazole 5 MG tablet  · OXcarbazepine 300 MG tablet     Information about where to get these medications is not yet available    Ask your nurse or doctor about these medications  · nicotine polacrilex 4 MG gum       Patient is counseled if she continues to abuse drugs or alcohol she could act out impulsively causing serious harm to herself or others even though it may be unintentional.  She demonstrated understanding of this and has the capacity understand this    Patient is counseled her mental health treatment will be difficult to optimize with ongoing use of drugs or alcohol she demonstrated understanding of this and has the capacity to understand this     Patient is counseled she must remain compliant with all medications outpatient follow-up appointments    Patient is discharged home in stable condition      TIME SPEND - 35 MINUTES TO COMPLETE THE EVALUATION, DISCHARGE SUMMARY, MEDICATION RECONCILIATION AND FOLLOW UP CARE     Signed:  CALE Matt CNP  5/83/4750  9:29 AM

## 2021-09-27 NOTE — PROGRESS NOTES
CLINICAL PHARMACY NOTE: MEDS TO BEDS    Total # of Prescriptions Filled: 2   The following medications were delivered to the patient:  · Aripiprazole 5mg  · Oxcarbazepine 300mg    Additional Documentation:

## 2021-09-27 NOTE — GROUP NOTE
Group Therapy Note    Date: 9/27/2021    Group Start Time: 1100  Group End Time: 1130  Group Topic: Cognitive Skills    [facility]    JENNIFER Maldonado LSW        Group Therapy Note    Attendees: 13         Patient's Goal:  To participate in the group discussion on the cognitive triangle and challenging negative thoughts. Notes:  Pt was an active participant in group. Status After Intervention:  Improved    Participation Level:  Active Listener and Interactive    Participation Quality: Appropriate, Attentive, Sharing and Supportive      Speech:  normal      Thought Process/Content: Logical      Affective Functioning: Congruent      Mood: euthymic      Level of consciousness:  Alert and Oriented x4      Response to Learning: Able to verbalize current knowledge/experience      Endings: None Reported    Modes of Intervention: Education, Support, Socialization, Exploration, Clarifying and Problem-solving      Discipline Responsible: /Counselor      Signature:  JENNIFER Nguyen LSW

## 2021-09-27 NOTE — PROGRESS NOTES
Attended morning community meeting,   Updated on staffing and daily expectations. Shared goal for the day as to try to stay positive and try not to let others bother me.

## 2021-09-27 NOTE — GROUP NOTE
Group Therapy Note    Date: 9/27/2021    Group Start Time: 1000  Group End Time: 4484  Group Topic: Psychoeducation    [facility]    Magalie Pack, [employer]        Group Therapy Note                                                                        Group Therapy Note    Date: 9/27/2021    Type of Group: Psychoeducation    Wellness Binder Information  Module Name: id of stressors   Patient's Goal: will be able to id physical symptoms to stress. Notes:pleasant and engaged sharing when prompted. Status After Intervention:  Improved  Participation Level:  Active Listener and Interactive  Participation Quality: Appropriate, Attentive, Sharing, and Supportive  Speech: normal   Thought Process/Content: Logical  Affective Functioning: Congruent  Mood: euthymic  Level of consciousness:  Alert, Oriented x4, and Attentive  Response to Learning: Able to verbalize/acknowledge new learning, Able to retain information, and Progressing to goal  Endings: None Reported  Modes of Intervention: Education, Support, Socialization, and Exploration  Discipline Responsible: Psychoeducational Specialist  Signature:  Evie Hernández

## 2021-09-27 NOTE — PROGRESS NOTES
[address] [place] [address]  Discharge Note    Pt discharged with followings belongings:   Other Valuables: Cell phone (given to   9/23)   Valuables sent home with client  . Patient education on aftercare instructions: and copy of AVS given   . Patient verbalize understanding of AVS:  And meds.  .    Status EXAM upon discharge:  Status and Exam  Normal: Yes (pt. sleeping)  Facial Expression: Brightened  Affect: Appropriate  Level of Consciousness: Alert  Mood:Normal: No  Mood: Anxious  Motor Activity:Normal: Yes  Interview Behavior: Cooperative  Preception: Hammond to Person, Thurnell Merle to Time, Hammond to Place, Hammond to Situation  Attention:Normal: Yes  Attention: Distractible  Thought Processes:  (appropriate)  Thought Content:Normal: Yes  Thought Content: Preoccupations  Hallucinations: None  Delusions: No  Memory:Normal: Yes  Memory: Poor Recent, Poor Remote  Insight and Judgment: Yes  Insight and Judgment: Poor Judgment, Poor Insight  Present Suicidal Ideation: No  Present Homicidal Ideation: No      Metabolic Screening:    No results found for: LABA1C    No results found for: CHOL  No results found for: TRIG  No results found for: HDL  No components found for: LDLCAL  No results found for: Alyse Teague [address]  Discharge Note    Pt discharged with followings belongings:   Other Valuables: Cell phone (given to   9/23)      Status EXAM upon discharge:  Status and Exam  Normal: Yes (pt. sleeping)  Facial Expression: Brightened  Affect: Appropriate  Level of Consciousness: Alert  Mood:Normal: No  Mood: Anxious  Motor Activity:Normal: Yes  Interview Behavior: Cooperative  Preception: Hammond to Person, Hammond to Time, Hammond to Place, Hammond to Situation  Attention:Normal: Yes  Attention: Distractible  Thought Processes:  (appropriate)  Thought Content:Normal: Yes  Thought Content: Preoccupations  Hallucinations: None  Delusions: No  Memory:Normal: Yes  Memory: Poor Recent, Poor Remote  Insight and Judgment: Yes  Insight and Judgment: Poor Judgment, Poor Insight  Present Suicidal Ideation: No  Present Homicidal Ideation: No      Metabolic Screening:    No results found for: LABA1C    No results found for: CHOL  No results found for: TRIG  No results found for: HDL  No components found for: LDLCAL  No results found for: Tiny Pro, RN

## 2021-12-30 ENCOUNTER — HOSPITAL ENCOUNTER (OUTPATIENT)
Age: 51
Discharge: HOME | End: 2021-12-30

## 2021-12-30 LAB
ALBUMIN SERPL-MCNC: 4.2 G/DL (ref 3.5–5.2)
ALP BLD-CCNC: 109 U/L (ref 35–104)
ALT SERPL-CCNC: 22 U/L (ref 0–32)
ANION GAP SERPL CALCULATED.3IONS-SCNC: 14 MMOL/L (ref 7–16)
AST SERPL-CCNC: 24 U/L (ref 0–31)
BILIRUB SERPL-MCNC: 0.5 MG/DL (ref 0–1.2)
BUN BLDV-MCNC: 21 MG/DL (ref 6–20)
CALCIUM SERPL-MCNC: 9.6 MG/DL (ref 8.6–10.2)
CHLORIDE BLD-SCNC: 104 MMOL/L (ref 98–107)
CHOLESTEROL, TOTAL: 233 MG/DL (ref 0–199)
CO2: 23 MMOL/L (ref 22–29)
CREAT SERPL-MCNC: 0.8 MG/DL (ref 0.5–1)
GFR AFRICAN AMERICAN: >60
GFR NON-AFRICAN AMERICAN: >60 ML/MIN/1.73
GLUCOSE BLD-MCNC: 91 MG/DL (ref 74–99)
HCT VFR BLD CALC: 32.3 % (ref 34–48)
HDLC SERPL-MCNC: 68 MG/DL
HEMOGLOBIN: 10.4 G/DL (ref 11.5–15.5)
LDL CHOLESTEROL CALCULATED: 148 MG/DL (ref 0–99)
MCH RBC QN AUTO: 30.2 PG (ref 26–35)
MCHC RBC AUTO-ENTMCNC: 32.2 % (ref 32–34.5)
MCV RBC AUTO: 93.9 FL (ref 80–99.9)
PDW BLD-RTO: 13.5 FL (ref 11.5–15)
PLATELET # BLD: 214 E9/L (ref 130–450)
PMV BLD AUTO: 10.2 FL (ref 7–12)
POTASSIUM SERPL-SCNC: 3.9 MMOL/L (ref 3.5–5)
RBC # BLD: 3.44 E12/L (ref 3.5–5.5)
SODIUM BLD-SCNC: 141 MMOL/L (ref 132–146)
TOTAL PROTEIN: 7 G/DL (ref 6.4–8.3)
TRIGL SERPL-MCNC: 86 MG/DL (ref 0–149)
TSH SERPL DL<=0.05 MIU/L-ACNC: 2.59 UIU/ML (ref 0.27–4.2)
VLDLC SERPL CALC-MCNC: 17 MG/DL
WBC # BLD: 5.2 E9/L (ref 4.5–11.5)

## 2021-12-30 PROCEDURE — 85027 COMPLETE CBC AUTOMATED: CPT

## 2021-12-30 PROCEDURE — 80061 LIPID PANEL: CPT

## 2021-12-30 PROCEDURE — 84443 ASSAY THYROID STIM HORMONE: CPT

## 2021-12-30 PROCEDURE — 36415 COLL VENOUS BLD VENIPUNCTURE: CPT

## 2021-12-30 PROCEDURE — 80053 COMPREHEN METABOLIC PANEL: CPT
